# Patient Record
Sex: MALE | Race: OTHER | NOT HISPANIC OR LATINO
[De-identification: names, ages, dates, MRNs, and addresses within clinical notes are randomized per-mention and may not be internally consistent; named-entity substitution may affect disease eponyms.]

---

## 2017-05-28 ENCOUNTER — TRANSCRIPTION ENCOUNTER (OUTPATIENT)
Age: 37
End: 2017-05-28

## 2017-08-09 ENCOUNTER — TRANSCRIPTION ENCOUNTER (OUTPATIENT)
Age: 37
End: 2017-08-09

## 2018-02-20 ENCOUNTER — TRANSCRIPTION ENCOUNTER (OUTPATIENT)
Age: 38
End: 2018-02-20

## 2020-03-31 ENCOUNTER — TRANSCRIPTION ENCOUNTER (OUTPATIENT)
Age: 40
End: 2020-03-31

## 2020-06-26 RX ORDER — CEPHALEXIN 500 MG
500 CAPSULE ORAL
Qty: 0 | Refills: 0 | DISCHARGE
Start: 2020-06-26

## 2020-06-26 RX ORDER — MUPIROCIN 20 MG/G
1 OINTMENT TOPICAL
Qty: 0 | Refills: 0 | DISCHARGE
Start: 2020-06-26

## 2020-06-28 ENCOUNTER — INPATIENT (INPATIENT)
Facility: HOSPITAL | Age: 40
LOS: 2 days | Discharge: HOME | DRG: 603 | End: 2020-07-01
Attending: INTERNAL MEDICINE | Admitting: INTERNAL MEDICINE
Payer: COMMERCIAL

## 2020-06-28 ENCOUNTER — EMERGENCY (EMERGENCY)
Facility: HOSPITAL | Age: 40
LOS: 1 days | End: 2020-06-28
Attending: EMERGENCY MEDICINE | Admitting: EMERGENCY MEDICINE
Payer: COMMERCIAL

## 2020-06-28 VITALS
WEIGHT: 184.97 LBS | DIASTOLIC BLOOD PRESSURE: 80 MMHG | HEART RATE: 68 BPM | TEMPERATURE: 98 F | SYSTOLIC BLOOD PRESSURE: 140 MMHG | HEIGHT: 71 IN | RESPIRATION RATE: 15 BRPM | OXYGEN SATURATION: 98 %

## 2020-06-28 VITALS
HEART RATE: 88 BPM | OXYGEN SATURATION: 100 % | SYSTOLIC BLOOD PRESSURE: 129 MMHG | RESPIRATION RATE: 13 BRPM | DIASTOLIC BLOOD PRESSURE: 85 MMHG | TEMPERATURE: 98 F

## 2020-06-28 VITALS
DIASTOLIC BLOOD PRESSURE: 89 MMHG | SYSTOLIC BLOOD PRESSURE: 136 MMHG | WEIGHT: 184.97 LBS | HEART RATE: 75 BPM | OXYGEN SATURATION: 99 % | HEIGHT: 71 IN | TEMPERATURE: 98 F | RESPIRATION RATE: 14 BRPM

## 2020-06-28 DIAGNOSIS — I10 ESSENTIAL (PRIMARY) HYPERTENSION: ICD-10-CM

## 2020-06-28 DIAGNOSIS — L03.90 CELLULITIS, UNSPECIFIED: ICD-10-CM

## 2020-06-28 DIAGNOSIS — Z29.9 ENCOUNTER FOR PROPHYLACTIC MEASURES, UNSPECIFIED: ICD-10-CM

## 2020-06-28 LAB
ALBUMIN SERPL ELPH-MCNC: 3.8 G/DL — SIGNIFICANT CHANGE UP (ref 3.3–5)
ALP SERPL-CCNC: 53 U/L — SIGNIFICANT CHANGE UP (ref 30–120)
ALT FLD-CCNC: 18 U/L DA — SIGNIFICANT CHANGE UP (ref 10–60)
ANION GAP SERPL CALC-SCNC: 12 MMOL/L — SIGNIFICANT CHANGE UP (ref 5–17)
ANION GAP SERPL CALC-SCNC: 5 MMOL/L — SIGNIFICANT CHANGE UP (ref 5–17)
APTT BLD: 37 SEC — SIGNIFICANT CHANGE UP (ref 28.5–37)
AST SERPL-CCNC: 20 U/L — SIGNIFICANT CHANGE UP (ref 10–40)
BASOPHILS # BLD AUTO: 0.03 K/UL — SIGNIFICANT CHANGE UP (ref 0–0.2)
BASOPHILS # BLD AUTO: 0.04 K/UL — SIGNIFICANT CHANGE UP (ref 0–0.2)
BASOPHILS NFR BLD AUTO: 0.4 % — SIGNIFICANT CHANGE UP (ref 0–2)
BASOPHILS NFR BLD AUTO: 0.6 % — SIGNIFICANT CHANGE UP (ref 0–2)
BILIRUB SERPL-MCNC: 0.4 MG/DL — SIGNIFICANT CHANGE UP (ref 0.2–1.2)
BUN SERPL-MCNC: 10 MG/DL — SIGNIFICANT CHANGE UP (ref 7–23)
BUN SERPL-MCNC: 12 MG/DL — SIGNIFICANT CHANGE UP (ref 7–23)
CALCIUM SERPL-MCNC: 8.4 MG/DL — LOW (ref 8.5–10.1)
CALCIUM SERPL-MCNC: 9.1 MG/DL — SIGNIFICANT CHANGE UP (ref 8.4–10.5)
CHLORIDE SERPL-SCNC: 108 MMOL/L — SIGNIFICANT CHANGE UP (ref 96–108)
CHLORIDE SERPL-SCNC: 112 MMOL/L — HIGH (ref 96–108)
CO2 SERPL-SCNC: 22 MMOL/L — SIGNIFICANT CHANGE UP (ref 22–31)
CO2 SERPL-SCNC: 26 MMOL/L — SIGNIFICANT CHANGE UP (ref 22–31)
CREAT SERPL-MCNC: 1.1 MG/DL — SIGNIFICANT CHANGE UP (ref 0.5–1.3)
CREAT SERPL-MCNC: 1.24 MG/DL — SIGNIFICANT CHANGE UP (ref 0.5–1.3)
EOSINOPHIL # BLD AUTO: 0.35 K/UL — SIGNIFICANT CHANGE UP (ref 0–0.5)
EOSINOPHIL # BLD AUTO: 0.4 K/UL — SIGNIFICANT CHANGE UP (ref 0–0.5)
EOSINOPHIL NFR BLD AUTO: 4.4 % — SIGNIFICANT CHANGE UP (ref 0–6)
EOSINOPHIL NFR BLD AUTO: 5.7 % — SIGNIFICANT CHANGE UP (ref 0–6)
GLUCOSE SERPL-MCNC: 101 MG/DL — HIGH (ref 70–99)
GLUCOSE SERPL-MCNC: 83 MG/DL — SIGNIFICANT CHANGE UP (ref 70–99)
HCT VFR BLD CALC: 40.8 % — SIGNIFICANT CHANGE UP (ref 39–50)
HCT VFR BLD CALC: 47.3 % — SIGNIFICANT CHANGE UP (ref 39–50)
HGB BLD-MCNC: 13.9 G/DL — SIGNIFICANT CHANGE UP (ref 13–17)
HGB BLD-MCNC: 15.7 G/DL — SIGNIFICANT CHANGE UP (ref 13–17)
IMM GRANULOCYTES NFR BLD AUTO: 0.3 % — SIGNIFICANT CHANGE UP (ref 0–1.5)
IMM GRANULOCYTES NFR BLD AUTO: 0.3 % — SIGNIFICANT CHANGE UP (ref 0–1.5)
INR BLD: 0.98 RATIO — SIGNIFICANT CHANGE UP (ref 0.88–1.16)
LACTATE SERPL-SCNC: 0.9 MMOL/L — SIGNIFICANT CHANGE UP (ref 0.7–2)
LYMPHOCYTES # BLD AUTO: 1.7 K/UL — SIGNIFICANT CHANGE UP (ref 1–3.3)
LYMPHOCYTES # BLD AUTO: 1.84 K/UL — SIGNIFICANT CHANGE UP (ref 1–3.3)
LYMPHOCYTES # BLD AUTO: 21.3 % — SIGNIFICANT CHANGE UP (ref 13–44)
LYMPHOCYTES # BLD AUTO: 26.1 % — SIGNIFICANT CHANGE UP (ref 13–44)
MCHC RBC-ENTMCNC: 31.7 PG — SIGNIFICANT CHANGE UP (ref 27–34)
MCHC RBC-ENTMCNC: 31.8 PG — SIGNIFICANT CHANGE UP (ref 27–34)
MCHC RBC-ENTMCNC: 33.2 GM/DL — SIGNIFICANT CHANGE UP (ref 32–36)
MCHC RBC-ENTMCNC: 34.1 GM/DL — SIGNIFICANT CHANGE UP (ref 32–36)
MCV RBC AUTO: 93.2 FL — SIGNIFICANT CHANGE UP (ref 80–100)
MCV RBC AUTO: 95.9 FL — SIGNIFICANT CHANGE UP (ref 80–100)
MONOCYTES # BLD AUTO: 0.62 K/UL — SIGNIFICANT CHANGE UP (ref 0–0.9)
MONOCYTES # BLD AUTO: 0.71 K/UL — SIGNIFICANT CHANGE UP (ref 0–0.9)
MONOCYTES NFR BLD AUTO: 8.8 % — SIGNIFICANT CHANGE UP (ref 2–14)
MONOCYTES NFR BLD AUTO: 8.9 % — SIGNIFICANT CHANGE UP (ref 2–14)
NEUTROPHILS # BLD AUTO: 4.12 K/UL — SIGNIFICANT CHANGE UP (ref 1.8–7.4)
NEUTROPHILS # BLD AUTO: 5.17 K/UL — SIGNIFICANT CHANGE UP (ref 1.8–7.4)
NEUTROPHILS NFR BLD AUTO: 58.5 % — SIGNIFICANT CHANGE UP (ref 43–77)
NEUTROPHILS NFR BLD AUTO: 64.7 % — SIGNIFICANT CHANGE UP (ref 43–77)
NRBC # BLD: 0 /100 WBCS — SIGNIFICANT CHANGE UP (ref 0–0)
NRBC # BLD: 0 /100 WBCS — SIGNIFICANT CHANGE UP (ref 0–0)
PLATELET # BLD AUTO: 220 K/UL — SIGNIFICANT CHANGE UP (ref 150–400)
PLATELET # BLD AUTO: 255 K/UL — SIGNIFICANT CHANGE UP (ref 150–400)
POTASSIUM SERPL-MCNC: 3.8 MMOL/L — SIGNIFICANT CHANGE UP (ref 3.5–5.3)
POTASSIUM SERPL-MCNC: 4.2 MMOL/L — SIGNIFICANT CHANGE UP (ref 3.5–5.3)
POTASSIUM SERPL-SCNC: 3.8 MMOL/L — SIGNIFICANT CHANGE UP (ref 3.5–5.3)
POTASSIUM SERPL-SCNC: 4.2 MMOL/L — SIGNIFICANT CHANGE UP (ref 3.5–5.3)
PROT SERPL-MCNC: 7.9 G/DL — SIGNIFICANT CHANGE UP (ref 6–8.3)
PROTHROM AB SERPL-ACNC: 10.9 SEC — SIGNIFICANT CHANGE UP (ref 10–12.9)
RBC # BLD: 4.38 M/UL — SIGNIFICANT CHANGE UP (ref 4.2–5.8)
RBC # BLD: 4.93 M/UL — SIGNIFICANT CHANGE UP (ref 4.2–5.8)
RBC # FLD: 12.8 % — SIGNIFICANT CHANGE UP (ref 10.3–14.5)
RBC # FLD: 12.9 % — SIGNIFICANT CHANGE UP (ref 10.3–14.5)
SARS-COV-2 RNA SPEC QL NAA+PROBE: SIGNIFICANT CHANGE UP
SODIUM SERPL-SCNC: 142 MMOL/L — SIGNIFICANT CHANGE UP (ref 135–145)
SODIUM SERPL-SCNC: 143 MMOL/L — SIGNIFICANT CHANGE UP (ref 135–145)
WBC # BLD: 7.04 K/UL — SIGNIFICANT CHANGE UP (ref 3.8–10.5)
WBC # BLD: 7.98 K/UL — SIGNIFICANT CHANGE UP (ref 3.8–10.5)
WBC # FLD AUTO: 7.04 K/UL — SIGNIFICANT CHANGE UP (ref 3.8–10.5)
WBC # FLD AUTO: 7.98 K/UL — SIGNIFICANT CHANGE UP (ref 3.8–10.5)

## 2020-06-28 PROCEDURE — 80053 COMPREHEN METABOLIC PANEL: CPT

## 2020-06-28 PROCEDURE — 87040 BLOOD CULTURE FOR BACTERIA: CPT

## 2020-06-28 PROCEDURE — 96365 THER/PROPH/DIAG IV INF INIT: CPT

## 2020-06-28 PROCEDURE — 85610 PROTHROMBIN TIME: CPT

## 2020-06-28 PROCEDURE — 36415 COLL VENOUS BLD VENIPUNCTURE: CPT

## 2020-06-28 PROCEDURE — U0003: CPT

## 2020-06-28 PROCEDURE — 99283 EMERGENCY DEPT VISIT LOW MDM: CPT

## 2020-06-28 PROCEDURE — 99285 EMERGENCY DEPT VISIT HI MDM: CPT

## 2020-06-28 PROCEDURE — 83605 ASSAY OF LACTIC ACID: CPT

## 2020-06-28 PROCEDURE — 99285 EMERGENCY DEPT VISIT HI MDM: CPT | Mod: 25

## 2020-06-28 PROCEDURE — 96375 TX/PRO/DX INJ NEW DRUG ADDON: CPT

## 2020-06-28 PROCEDURE — 93005 ELECTROCARDIOGRAM TRACING: CPT

## 2020-06-28 PROCEDURE — 85730 THROMBOPLASTIN TIME PARTIAL: CPT

## 2020-06-28 PROCEDURE — 71046 X-RAY EXAM CHEST 2 VIEWS: CPT

## 2020-06-28 PROCEDURE — 93010 ELECTROCARDIOGRAM REPORT: CPT

## 2020-06-28 PROCEDURE — 71046 X-RAY EXAM CHEST 2 VIEWS: CPT | Mod: 26

## 2020-06-28 PROCEDURE — 85027 COMPLETE CBC AUTOMATED: CPT

## 2020-06-28 RX ORDER — OXYCODONE AND ACETAMINOPHEN 5; 325 MG/1; MG/1
1 TABLET ORAL EVERY 4 HOURS
Refills: 0 | Status: DISCONTINUED | OUTPATIENT
Start: 2020-06-28 | End: 2020-06-28

## 2020-06-28 RX ORDER — HEPARIN SODIUM 5000 [USP'U]/ML
5000 INJECTION INTRAVENOUS; SUBCUTANEOUS EVERY 8 HOURS
Refills: 0 | Status: DISCONTINUED | OUTPATIENT
Start: 2020-06-28 | End: 2020-07-01

## 2020-06-28 RX ORDER — OXYCODONE AND ACETAMINOPHEN 5; 325 MG/1; MG/1
1 TABLET ORAL ONCE
Refills: 0 | Status: DISCONTINUED | OUTPATIENT
Start: 2020-06-28 | End: 2020-06-28

## 2020-06-28 RX ORDER — VANCOMYCIN HCL 1 G
1000 VIAL (EA) INTRAVENOUS ONCE
Refills: 0 | Status: COMPLETED | OUTPATIENT
Start: 2020-06-28 | End: 2020-06-28

## 2020-06-28 RX ORDER — VANCOMYCIN HCL 1 G
VIAL (EA) INTRAVENOUS
Refills: 0 | Status: DISCONTINUED | OUTPATIENT
Start: 2020-06-28 | End: 2020-06-30

## 2020-06-28 RX ORDER — VANCOMYCIN HCL 1 G
1000 VIAL (EA) INTRAVENOUS EVERY 8 HOURS
Refills: 0 | Status: DISCONTINUED | OUTPATIENT
Start: 2020-06-29 | End: 2020-06-30

## 2020-06-28 RX ORDER — OXYCODONE AND ACETAMINOPHEN 5; 325 MG/1; MG/1
2 TABLET ORAL EVERY 6 HOURS
Refills: 0 | Status: DISCONTINUED | OUTPATIENT
Start: 2020-06-28 | End: 2020-06-30

## 2020-06-28 RX ORDER — PIPERACILLIN AND TAZOBACTAM 4; .5 G/20ML; G/20ML
3.38 INJECTION, POWDER, LYOPHILIZED, FOR SOLUTION INTRAVENOUS ONCE
Refills: 0 | Status: COMPLETED | OUTPATIENT
Start: 2020-06-28 | End: 2020-06-28

## 2020-06-28 RX ORDER — SODIUM CHLORIDE 9 MG/ML
2600 INJECTION, SOLUTION INTRAVENOUS ONCE
Refills: 0 | Status: COMPLETED | OUTPATIENT
Start: 2020-06-28 | End: 2020-06-28

## 2020-06-28 RX ADMIN — Medication 250 MILLIGRAM(S): at 10:30

## 2020-06-28 RX ADMIN — HEPARIN SODIUM 5000 UNIT(S): 5000 INJECTION INTRAVENOUS; SUBCUTANEOUS at 21:38

## 2020-06-28 RX ADMIN — Medication 250 MILLIGRAM(S): at 18:07

## 2020-06-28 RX ADMIN — OXYCODONE AND ACETAMINOPHEN 1 TABLET(S): 5; 325 TABLET ORAL at 11:47

## 2020-06-28 RX ADMIN — OXYCODONE AND ACETAMINOPHEN 2 TABLET(S): 5; 325 TABLET ORAL at 19:55

## 2020-06-28 RX ADMIN — PIPERACILLIN AND TAZOBACTAM 3.38 GRAM(S): 4; .5 INJECTION, POWDER, LYOPHILIZED, FOR SOLUTION INTRAVENOUS at 10:31

## 2020-06-28 RX ADMIN — SODIUM CHLORIDE 2600 MILLILITER(S): 9 INJECTION, SOLUTION INTRAVENOUS at 10:30

## 2020-06-28 RX ADMIN — OXYCODONE AND ACETAMINOPHEN 1 TABLET(S): 5; 325 TABLET ORAL at 15:27

## 2020-06-28 RX ADMIN — PIPERACILLIN AND TAZOBACTAM 200 GRAM(S): 4; .5 INJECTION, POWDER, LYOPHILIZED, FOR SOLUTION INTRAVENOUS at 09:00

## 2020-06-28 NOTE — ED PROVIDER NOTE - CARE PLAN
Principal Discharge DX:	Cellulitis and abscess  Secondary Diagnosis:	Failure of outpatient treatment

## 2020-06-28 NOTE — H&P ADULT - RS GEN PE MLT RESP DETAILS PC
airway patent/clear to auscultation bilaterally/good air movement/no rhonchi/no rales/breath sounds equal

## 2020-06-28 NOTE — CONSULT NOTE ADULT - SUBJECTIVE AND OBJECTIVE BOX
HPI:41 yo male referred by dermatologist for iv abx due to worsening cellulitis. pt relates had cyst to left inner thigh, became painful erythematous on 6/25, saw derm 6/26, had I&D, started on doxy and keflex, borders of cellulitis drawn, but erythema has greatly expanded since then, so told by derm to go to hospital for iv abx. no fevers, chills, d/n/v, cp, sob, abd pain. pt seen at [place], transferred for admission      PAST MEDICAL & SURGICAL HISTORY:  neg    Antimicrobials      Immunological      Other  oxycodone    5 mG/acetaminophen 325 mG 1 Tablet(s) Oral every 4 hours PRN      Allergies    No Known Drug Allergies  shellfish (Hives (Mod to Severe))    Intolerances        SOCIAL HISTORY:  no toxic habits reported      FAMILY HISTORY: noncontrib      ROS:    EYES:  Negative  blurry vision or double vision  GASTROINTESTINAL:  Negative for nausea, vomiting, diarrhea  -otherwise negative except for subjective    Vital Signs Last 24 Hrs  T(C): 36.6 (28 Jun 2020 13:31), Max: 36.6 (28 Jun 2020 13:31)  T(F): 97.8 (28 Jun 2020 13:31), Max: 97.8 (28 Jun 2020 13:31)  HR: 60 (28 Jun 2020 13:31) (60 - 68)  BP: 128/85 (28 Jun 2020 13:31) (128/85 - 140/80)  BP(mean): --  RR: 16 (28 Jun 2020 13:31) (15 - 16)  SpO2: 100% (28 Jun 2020 13:31) (98% - 100%)    PE:  WDWN in no distress  HEENT:  NC, PERRL, sclerae anicteric, conjunctivae clear, EOMI.  Sinuses nontender, no nasal exudate.  No buccal or pharyngeal lesions, erythema or exudate  Neck:  Supple, no adenopathy  Lungs:  No accessory muscle use, bilaterally clear to auscultation  Cor:  RRR, S1, S2, no murmur appreciated  Abd:  Symmetric, normoactive BS.  Soft, nontender, no masses, guarding or rebound.  Liver and spleen not enlarged  Extrem:  No cyanosis or edema  Skin:  left grin with warm red area with noted fluid collection, very tender  Neuro: grossly intact  Musc: moving all limbs freely, no focal deficits    LABS:                                MICROBIOLOGY:      RADIOLOGY & ADDITIONAL STUDIES:    --

## 2020-06-28 NOTE — H&P ADULT - HISTORY OF PRESENT ILLNESS
40 M with pmhx htn referred by dermatologist for iv antibiotics due to worsening Cellulitis.   Patient reports fever since last week with worsening swelling and pain of his Left thigh noticed pus from the Cyst which prompted him to go to dermatologist who referred him to ED for iv antibiotics.     In the ed patient was seen by ID, received Vancomycin. 40 M with pmhx htn referred by dermatologist for iv antibiotics due to worsening Cellulitis.   Patient reports fever since last week with worsening swelling and pain of his Left thigh noticed pus from the Cyst which prompted him to go to dermatologist who referred him to ED for iv antibiotics.   Patient reports of having cyst for many years with no hx similar episodes in the past.   No hx similar episodes in the past.     In the ed patient was seen by ID, received Vancomycin.

## 2020-06-28 NOTE — ED PROVIDER NOTE - OBJECTIVE STATEMENT
pt referred by dermatologist for iv abx due to worsening cellulitis. pt relates had cyst to left inner thigh, became painful erythematous on 6/25, saw derm 6/26, had I&D, started on doxy and keflex, borders of cellulitis drawn, but erythema has greatly expanded since then, so told by derm to go to hospital for iv abx. no fevers, chills, d/n/v, cp, sob, abd pain. pt seen at [place], transferred for admission  pmd - [place]

## 2020-06-28 NOTE — ED PROVIDER NOTE - PHYSICAL EXAMINATION
GEN: awake, alert, well appearing, NAD   HENT: atraumatic, normocephalic, SUNDAR, EOMI, no midline instability, oropharynx w/o erythema or exudates, no lymphadenopathy  CV: normal rate and rhythm, S1, S2, no MRG, equal pulses throughout, no JVD  RESP: no distress, no IWOB, no retraction, clear to auscultation bilaterally   ABD: soft, nontender, nondistended, no rebound, no guarding, normoactive bowel sounds, no organomegally  MUSCULOSKELETAL: strenght 5/5 x 4, full range of motion, CMS intact   SKIN: L inguinal abscess, cellulitis open, draining   NEURO: Awake alert oriented x 3, no facial asymmetry, no slurred speech, no pronator drift, moving all extremities  PSYCH: no suicial ideation, no homicidal ideation, no depression, no anxiety, no hallucination

## 2020-06-28 NOTE — ED ADULT NURSE NOTE - OBJECTIVE STATEMENT
A&Ox4. VSS. Afebrile. Transferred from [place] for cellulitis. Lt inner thigh redness and swelling noted 14cm x 7.5cm. Inguinal abcess noted 2.5 x 2cm. Pt reports condition worsening on 6/25 and began doxycycline. Pain 8/10. Pt also has vesicles on his rt shin. A&Ox4. VSS. Afebrile. Transferred from [place] for cellulitis. Lt inner thigh redness and swelling noted 14cm x 7.5cm. Lt Inguinal abscess noted 2.5 x 2cm. Pt reports condition worsening on 6/25 and began doxycycline. Pain 8/10. Pt also has vesicles on his rt shin. A&Ox4. VSS. Afebrile. Transferred from [place] for cellulitis. Lt inner thigh redness and swelling noted 14cm x 7.5cm. Lt Inguinal cyst noted 2.5 x 2cm. Pt reports condition worsening on 6/25 and began doxycycline. Pain 8/10. Pt also has vesicles on his rt shin. A&Ox4. VSS. Afebrile. Transferred from [place] for cellulitis. Lt inner thigh redness and swelling noted 14cm x 7.5cm. Lt Inguinal abscess noted 2.5 x 2cm. Pt reports condition worsening on 6/25 and began doxycycline. Pain 8/10. Pt also has vesicles on his rt shin.

## 2020-06-28 NOTE — ED PROVIDER NOTE - CLINICAL SUMMARY MEDICAL DECISION MAKING FREE TEXT BOX
pt referred by dermatologist for iv abx due to worsening cellulitis. pt relates had cyst to left inner thigh, became painful erythematous on 6/25, saw derm 6/26, had I&D, started on doxy and keflex, borders of cellulitis drawn, but erythema has greatly expanded since then, so told by derm to go to hospital for iv abx. no fevers, chills, d/n/v, cp, sob, abd pain. pt seen at [place], transferred for admission  pmd - [place]

## 2020-06-28 NOTE — CONSULT NOTE ADULT - PROBLEM SELECTOR RECOMMENDATION 9
most critical is proper I+D but will associated purlent cellulitis will recommend Vnao IV-ideal if I+D sample can be sent for MRSA PCR and cultures  Thank you for consulting us and involving us in the management of this most interesting and challenging case.     We will follow along in the care of this patient.

## 2020-06-28 NOTE — ED ADULT NURSE NOTE - OBJECTIVE STATEMENT
41 y/o male received aox4 ambulatory sent to ED by PMD for left thigh cellulitis eval. pt reports having a cyst that was drained by PMD 2 days ago and has since developed redness to the left groin area and has now spreaded to upper thigh. area noted warm to touch. IVL started, antibiotics started.

## 2020-06-28 NOTE — ED PROVIDER NOTE - OBJECTIVE STATEMENT
pt referred by dermatologist for iv abx due to worsening cellulitis. pt relates had cyst to left inner thigh, became painful erythematous on 6/25, saw derm 6/26, had I&D, started on doxy and keflex, borders of cellulitis drawn, but erythema has greatly expanded since then, so told by derm to go to hospital for iv abx. no fevers, chills, d/n/v, cp, sob, abd pain.  pmd - [place]

## 2020-06-28 NOTE — H&P ADULT - ASSESSMENT
40 M with pmhx htn referred by dermatologist for iv antibiotics due to worsening Left thigh Cellulitis and abscess.

## 2020-06-28 NOTE — H&P ADULT - PROBLEM SELECTOR PLAN 1
s/p I and D by derm as out patient, start patient on vancomycin as per ID.  Follow up Blood cultures.  Follow up cultures done out patient and repeat I and D here if worsens.

## 2020-06-28 NOTE — ED PROVIDER NOTE - SKIN, MLM
Skin normal color for race, warm, dry. abscess left inner thigh with drainage and large surrounding cellulitis

## 2020-06-28 NOTE — ED ADULT NURSE NOTE - CHPI ED NUR SYMPTOMS NEG
no bleeding at site/no chills/no rectal pain/no fever/no vomiting/no blood in mucus/no drainage/no purulent drainage

## 2020-06-29 PROBLEM — I10 ESSENTIAL (PRIMARY) HYPERTENSION: Chronic | Status: ACTIVE | Noted: 2020-06-28

## 2020-06-29 LAB
ANION GAP SERPL CALC-SCNC: 5 MMOL/L — SIGNIFICANT CHANGE UP (ref 5–17)
BASOPHILS # BLD AUTO: 0.06 K/UL — SIGNIFICANT CHANGE UP (ref 0–0.2)
BASOPHILS NFR BLD AUTO: 0.9 % — SIGNIFICANT CHANGE UP (ref 0–2)
BUN SERPL-MCNC: 12 MG/DL — SIGNIFICANT CHANGE UP (ref 7–23)
CALCIUM SERPL-MCNC: 8.5 MG/DL — SIGNIFICANT CHANGE UP (ref 8.5–10.1)
CHLORIDE SERPL-SCNC: 110 MMOL/L — HIGH (ref 96–108)
CO2 SERPL-SCNC: 27 MMOL/L — SIGNIFICANT CHANGE UP (ref 22–31)
CREAT SERPL-MCNC: 1.1 MG/DL — SIGNIFICANT CHANGE UP (ref 0.5–1.3)
EOSINOPHIL # BLD AUTO: 0.38 K/UL — SIGNIFICANT CHANGE UP (ref 0–0.5)
EOSINOPHIL NFR BLD AUTO: 6 % — SIGNIFICANT CHANGE UP (ref 0–6)
GLUCOSE SERPL-MCNC: 126 MG/DL — HIGH (ref 70–99)
HCT VFR BLD CALC: 41.1 % — SIGNIFICANT CHANGE UP (ref 39–50)
HGB BLD-MCNC: 13.7 G/DL — SIGNIFICANT CHANGE UP (ref 13–17)
IMM GRANULOCYTES NFR BLD AUTO: 0.5 % — SIGNIFICANT CHANGE UP (ref 0–1.5)
LYMPHOCYTES # BLD AUTO: 1.76 K/UL — SIGNIFICANT CHANGE UP (ref 1–3.3)
LYMPHOCYTES # BLD AUTO: 27.8 % — SIGNIFICANT CHANGE UP (ref 13–44)
MCHC RBC-ENTMCNC: 31.4 PG — SIGNIFICANT CHANGE UP (ref 27–34)
MCHC RBC-ENTMCNC: 33.3 GM/DL — SIGNIFICANT CHANGE UP (ref 32–36)
MCV RBC AUTO: 94.1 FL — SIGNIFICANT CHANGE UP (ref 80–100)
MONOCYTES # BLD AUTO: 0.49 K/UL — SIGNIFICANT CHANGE UP (ref 0–0.9)
MONOCYTES NFR BLD AUTO: 7.7 % — SIGNIFICANT CHANGE UP (ref 2–14)
NEUTROPHILS # BLD AUTO: 3.61 K/UL — SIGNIFICANT CHANGE UP (ref 1.8–7.4)
NEUTROPHILS NFR BLD AUTO: 57.1 % — SIGNIFICANT CHANGE UP (ref 43–77)
NRBC # BLD: 0 /100 WBCS — SIGNIFICANT CHANGE UP (ref 0–0)
PLATELET # BLD AUTO: 216 K/UL — SIGNIFICANT CHANGE UP (ref 150–400)
POTASSIUM SERPL-MCNC: 3.8 MMOL/L — SIGNIFICANT CHANGE UP (ref 3.5–5.3)
POTASSIUM SERPL-SCNC: 3.8 MMOL/L — SIGNIFICANT CHANGE UP (ref 3.5–5.3)
RBC # BLD: 4.37 M/UL — SIGNIFICANT CHANGE UP (ref 4.2–5.8)
RBC # FLD: 12.8 % — SIGNIFICANT CHANGE UP (ref 10.3–14.5)
SODIUM SERPL-SCNC: 142 MMOL/L — SIGNIFICANT CHANGE UP (ref 135–145)
VANCOMYCIN TROUGH SERPL-MCNC: 11.2 UG/ML — SIGNIFICANT CHANGE UP (ref 10–20)
VANCOMYCIN TROUGH SERPL-MCNC: 14.2 UG/ML — SIGNIFICANT CHANGE UP (ref 10–20)
WBC # BLD: 6.33 K/UL — SIGNIFICANT CHANGE UP (ref 3.8–10.5)
WBC # FLD AUTO: 6.33 K/UL — SIGNIFICANT CHANGE UP (ref 3.8–10.5)

## 2020-06-29 RX ADMIN — Medication 250 MILLIGRAM(S): at 23:03

## 2020-06-29 RX ADMIN — OXYCODONE AND ACETAMINOPHEN 2 TABLET(S): 5; 325 TABLET ORAL at 23:11

## 2020-06-29 RX ADMIN — OXYCODONE AND ACETAMINOPHEN 2 TABLET(S): 5; 325 TABLET ORAL at 10:36

## 2020-06-29 RX ADMIN — HEPARIN SODIUM 5000 UNIT(S): 5000 INJECTION INTRAVENOUS; SUBCUTANEOUS at 14:39

## 2020-06-29 RX ADMIN — Medication 250 MILLIGRAM(S): at 06:25

## 2020-06-29 RX ADMIN — OXYCODONE AND ACETAMINOPHEN 2 TABLET(S): 5; 325 TABLET ORAL at 04:31

## 2020-06-29 RX ADMIN — Medication 250 MILLIGRAM(S): at 14:38

## 2020-06-29 RX ADMIN — HEPARIN SODIUM 5000 UNIT(S): 5000 INJECTION INTRAVENOUS; SUBCUTANEOUS at 23:03

## 2020-06-29 RX ADMIN — HEPARIN SODIUM 5000 UNIT(S): 5000 INJECTION INTRAVENOUS; SUBCUTANEOUS at 04:32

## 2020-06-29 RX ADMIN — OXYCODONE AND ACETAMINOPHEN 2 TABLET(S): 5; 325 TABLET ORAL at 16:45

## 2020-06-29 NOTE — PROGRESS NOTE ADULT - SUBJECTIVE AND OBJECTIVE BOX
Patient is a 40y old  Male who presents with a chief complaint of     SUBJECTIVE / OVERNIGHT EVENTS: No events over night.      MEDICATIONS  (STANDING):  heparin   Injectable 5000 Unit(s) SubCutaneous every 8 hours  vancomycin  IVPB 1000 milliGRAM(s) IV Intermittent every 8 hours  vancomycin  IVPB        MEDICATIONS  (PRN):  oxycodone    5 mG/acetaminophen 325 mG 2 Tablet(s) Oral every 6 hours PRN Severe Pain (7 - 10)    CAPILLARY BLOOD GLUCOSE        I&O's Summary    29 Jun 2020 07:01  -  29 Jun 2020 23:06  --------------------------------------------------------  IN: 250 mL / OUT: 0 mL / NET: 250 mL  T(C): 36.4 (06-29-20 @ 16:27), Max: 36.4 (06-29-20 @ 16:27)  HR: 63 (06-29-20 @ 16:27) (63 - 63)  BP: 126/74 (06-29-20 @ 16:27) (126/74 - 126/74)  RR: 18 (06-29-20 @ 16:27) (18 - 18)  SpO2: 97% (06-29-20 @ 16:27) (97% - 97%)      PHYSICAL EXAM:  GENERAL: NAD, well-developed  HEAD:  Atraumatic, Normocephalic  EYES: EOMI, conjunctiva and sclera clear  NECK: Supple, No JVD  CHEST/LUNG: Clear to auscultation bilaterally; No wheeze  HEART: Regular rate and rhythm; No murmurs, rubs, or gallops  ABDOMEN: Soft, Nontender, Nondistended; Bowel sounds present  EXTREMITIES:  Left thigh erythematous tender swollen 2x2 cm wound with scab seen   2+ Peripheral Pulses, No clubbing, cyanosis, or edema  PSYCH: AAOx3  NEUROLOGY: non-focal  SKIN: No rashes or lesions                                        13.7   6.33  )-----------( 216      ( 29 Jun 2020 06:53 )             41.1           LIVER FUNCTIONS - ( 28 Jun 2020 09:32 )  Alb: 3.8 g/dL / Pro: 7.9 g/dL / ALK PHOS: 53 U/L / ALT: 18 U/L DA / AST: 20 U/L / GGT: x           PT/INR - ( 28 Jun 2020 09:32 )   PT: 10.9 sec;   INR: 0.98 ratio         PTT - ( 28 Jun 2020 09:32 )  PTT:37.0 sec  142|110|12<126  3.8|27|1.10  8.5,--,--  06-29 @ 06:53            RADIOLOGY & ADDITIONAL TESTS:    Imaging Personally Reviewed:    Consultant(s) Notes Reviewed:      Care Discussed with Consultants/Other Providers:

## 2020-06-29 NOTE — PROGRESS NOTE ADULT - SUBJECTIVE AND OBJECTIVE BOX
infectious diseases progress note:    ROSALEE ROMERO is a 40y y. o. Male patient    No concerning overnight events    Allergies    No Known Drug Allergies  shellfish (Hives (Mod to Severe))    Intolerances        ANTIBIOTICS/RELEVANT:  antimicrobials  vancomycin  IVPB 1000 milliGRAM(s) IV Intermittent every 8 hours  vancomycin  IVPB        immunologic:    OTHER:  heparin   Injectable 5000 Unit(s) SubCutaneous every 8 hours  oxycodone    5 mG/acetaminophen 325 mG 2 Tablet(s) Oral every 6 hours PRN      Objective:  Vital Signs Last 24 Hrs  T(C): 36.6 (29 Jun 2020 07:50), Max: 37.2 (28 Jun 2020 21:59)  T(F): 97.8 (29 Jun 2020 07:50), Max: 98.9 (28 Jun 2020 21:59)  HR: 53 (29 Jun 2020 07:50) (53 - 60)  BP: 117/77 (29 Jun 2020 07:50) (117/77 - 143/89)  BP(mean): 97 (28 Jun 2020 15:44) (97 - 97)  RR: 15 (29 Jun 2020 07:50) (15 - 16)  SpO2: 98% (29 Jun 2020 07:50) (98% - 100%)    T(C): 36.6 (06-29-20 @ 07:50), Max: 37.2 (06-28-20 @ 21:59)  T(C): 36.6 (06-29-20 @ 07:50), Max: 37.2 (06-28-20 @ 21:59)  T(C): 36.6 (06-29-20 @ 07:50), Max: 37.2 (06-28-20 @ 21:59)    PHYSICAL EXAM:  HEENT: NC atraumatic  Neck: supple  Respiratory: no accessory muscle use, breathing comfortably  Cardiovascular: distant  Gastrointestinal: normal appearing, nondistended  Extremities: no clubbing, no cyanosis,  Skin: erythema improved but abscess present      LABS:                          13.7   6.33  )-----------( 216      ( 29 Jun 2020 06:53 )             41.1       6.33 06-29 @ 06:53  7.04 06-28 @ 17:00      06-29    142  |  110<H>  |  12  ----------------------------<  126<H>  3.8   |  27  |  1.10    Ca    8.5      29 Jun 2020 06:53        Creatinine, Serum: 1.10 mg/dL (06-29-20 @ 06:53)  Creatinine, Serum: 1.10 mg/dL (06-28-20 @ 17:00)                INFLAMMATORY MARKERS  Auto Neutrophil #: 3.61 K/uL (06-29-20 @ 06:53)  Auto Lymphocyte #: 1.76 K/uL (06-29-20 @ 06:53)  Auto Neutrophil #: 4.12 K/uL (06-28-20 @ 17:00)  Auto Lymphocyte #: 1.84 K/uL (06-28-20 @ 17:00)      Auto Eosinophil #: 0.38 K/uL (06-29-20 @ 06:53)  Auto Eosinophil #: 0.40 K/uL (06-28-20 @ 17:00)                                MICROBIOLOGY:              RADIOLOGY & ADDITIONAL STUDIES:

## 2020-06-30 DIAGNOSIS — K59.00 CONSTIPATION, UNSPECIFIED: ICD-10-CM

## 2020-06-30 PROCEDURE — 99223 1ST HOSP IP/OBS HIGH 75: CPT | Mod: 25

## 2020-06-30 PROCEDURE — 10060 I&D ABSCESS SIMPLE/SINGLE: CPT

## 2020-06-30 RX ORDER — SENNA PLUS 8.6 MG/1
2 TABLET ORAL AT BEDTIME
Refills: 0 | Status: DISCONTINUED | OUTPATIENT
Start: 2020-06-30 | End: 2020-07-01

## 2020-06-30 RX ORDER — POLYETHYLENE GLYCOL 3350 17 G/17G
17 POWDER, FOR SOLUTION ORAL DAILY
Refills: 0 | Status: DISCONTINUED | OUTPATIENT
Start: 2020-06-30 | End: 2020-07-01

## 2020-06-30 RX ORDER — OXYCODONE AND ACETAMINOPHEN 5; 325 MG/1; MG/1
2 TABLET ORAL EVERY 4 HOURS
Refills: 0 | Status: DISCONTINUED | OUTPATIENT
Start: 2020-06-30 | End: 2020-07-01

## 2020-06-30 RX ORDER — LINEZOLID 600 MG/300ML
600 INJECTION, SOLUTION INTRAVENOUS EVERY 12 HOURS
Refills: 0 | Status: DISCONTINUED | OUTPATIENT
Start: 2020-06-30 | End: 2020-07-01

## 2020-06-30 RX ADMIN — OXYCODONE AND ACETAMINOPHEN 2 TABLET(S): 5; 325 TABLET ORAL at 18:12

## 2020-06-30 RX ADMIN — POLYETHYLENE GLYCOL 3350 17 GRAM(S): 17 POWDER, FOR SOLUTION ORAL at 12:48

## 2020-06-30 RX ADMIN — LINEZOLID 600 MILLIGRAM(S): 600 INJECTION, SOLUTION INTRAVENOUS at 12:15

## 2020-06-30 RX ADMIN — OXYCODONE AND ACETAMINOPHEN 2 TABLET(S): 5; 325 TABLET ORAL at 07:29

## 2020-06-30 RX ADMIN — LINEZOLID 600 MILLIGRAM(S): 600 INJECTION, SOLUTION INTRAVENOUS at 21:37

## 2020-06-30 RX ADMIN — OXYCODONE AND ACETAMINOPHEN 2 TABLET(S): 5; 325 TABLET ORAL at 12:48

## 2020-06-30 RX ADMIN — SENNA PLUS 2 TABLET(S): 8.6 TABLET ORAL at 21:37

## 2020-06-30 RX ADMIN — HEPARIN SODIUM 5000 UNIT(S): 5000 INJECTION INTRAVENOUS; SUBCUTANEOUS at 21:37

## 2020-06-30 RX ADMIN — HEPARIN SODIUM 5000 UNIT(S): 5000 INJECTION INTRAVENOUS; SUBCUTANEOUS at 06:30

## 2020-06-30 RX ADMIN — HEPARIN SODIUM 5000 UNIT(S): 5000 INJECTION INTRAVENOUS; SUBCUTANEOUS at 12:15

## 2020-06-30 RX ADMIN — OXYCODONE AND ACETAMINOPHEN 2 TABLET(S): 5; 325 TABLET ORAL at 23:54

## 2020-06-30 RX ADMIN — Medication 250 MILLIGRAM(S): at 06:30

## 2020-06-30 NOTE — PROGRESS NOTE ADULT - SUBJECTIVE AND OBJECTIVE BOX
Patient is a 40y old  Male who presents with a chief complaint of Left thigh pain     HPI:  40 M with pmhx htn referred by dermatologist for iv antibiotics due to worsening Cellulitis.   Patient reports fever since last week with worsening swelling and pain of his Left thigh noticed pus from the Cyst which prompted him to go to dermatologist who referred him to ED for iv antibiotics.   Patient reports of having cyst for many years with no hx similar episodes in the past.   No hx similar episodes in the past.     In the ed patient was seen by ID, received Vancomycin. (28 Jun 2020 15:44)      SUBJECTIVE / OVERNIGHT EVENTS: No events over night.   Patient still reports pain 9-10/10 in his Rt groin.   No other complaints.      MEDICATIONS  (STANDING):  heparin   Injectable 5000 Unit(s) SubCutaneous every 8 hours  linezolid    Tablet 600 milliGRAM(s) Oral every 12 hours  polyethylene glycol 3350 17 Gram(s) Oral daily  senna 2 Tablet(s) Oral at bedtime    MEDICATIONS  (PRN):  oxycodone    5 mG/acetaminophen 325 mG 2 Tablet(s) Oral every 4 hours PRN Severe Pain (7 - 10)  I&O's Summary      T(C): 36.5 (06-30-20 @ 08:09), Max: 36.5 (06-30-20 @ 08:09)  HR: 52 (06-30-20 @ 08:09) (52 - 69)  BP: 136/89 (06-30-20 @ 08:09) (109/66 - 136/89)  RR: 17 (06-30-20 @ 08:09) (17 - 18)  SpO2: 99% (06-30-20 @ 08:09) (94% - 99%)      PHYSICAL EXAM:  GENERAL: NAD, well-developed  HEAD:  Atraumatic, Normocephalic  EYES: EOMI, conjunctiva and sclera clear  NECK: Supple, No JVD  CHEST/LUNG: Clear to auscultation bilaterally; No wheeze  HEART: Regular rate and rhythm; No murmurs, rubs, or gallops  ABDOMEN: Soft, Nontender, Nondistended; Bowel sounds present  EXTREMITIES:  Left thigh erythematous tender swollen 2x2 cm wound with scab seen   2+ Peripheral Pulses, No clubbing, cyanosis, or edema  PSYCH: AAOx3  NEUROLOGY: non-focal  SKIN: No rashes or lesions                                                 13.7   6.33  )-----------( 216      ( 29 Jun 2020 06:53 )             41.1                       RADIOLOGY & ADDITIONAL TESTS:    Imaging Personally Reviewed:    Consultant(s) Notes Reviewed:      Care Discussed with Consultants/Other Providers: Patient is a 40y old  Male who presents with a chief complaint of Left thigh pain     HPI:  40 M with pmhx htn referred by dermatologist for iv antibiotics due to worsening Cellulitis.   Patient reports fever since last week with worsening swelling and pain of his Left thigh noticed pus from the Cyst which prompted him to go to dermatologist who referred him to ED for iv antibiotics.   Patient reports of having cyst for many years with no hx similar episodes in the past.   No hx similar episodes in the past.     In the ed patient was seen by ID, received Vancomycin. (28 Jun 2020 15:44)      SUBJECTIVE / OVERNIGHT EVENTS: No events over night.   Patient still reports pain 9-10/10 in his Lt groin.   No other complaints.      MEDICATIONS  (STANDING):  heparin   Injectable 5000 Unit(s) SubCutaneous every 8 hours  linezolid    Tablet 600 milliGRAM(s) Oral every 12 hours  polyethylene glycol 3350 17 Gram(s) Oral daily  senna 2 Tablet(s) Oral at bedtime    MEDICATIONS  (PRN):  oxycodone    5 mG/acetaminophen 325 mG 2 Tablet(s) Oral every 4 hours PRN Severe Pain (7 - 10)  I&O's Summary      T(C): 36.5 (06-30-20 @ 08:09), Max: 36.5 (06-30-20 @ 08:09)  HR: 52 (06-30-20 @ 08:09) (52 - 69)  BP: 136/89 (06-30-20 @ 08:09) (109/66 - 136/89)  RR: 17 (06-30-20 @ 08:09) (17 - 18)  SpO2: 99% (06-30-20 @ 08:09) (94% - 99%)      PHYSICAL EXAM:  GENERAL: NAD, well-developed  HEAD:  Atraumatic, Normocephalic  EYES: EOMI, conjunctiva and sclera clear  NECK: Supple, No JVD  CHEST/LUNG: Clear to auscultation bilaterally; No wheeze  HEART: Regular rate and rhythm; No murmurs, rubs, or gallops  ABDOMEN: Soft, Nontender, Nondistended; Bowel sounds present  EXTREMITIES:  Left thigh erythematous tender swollen 2x2 cm wound with scab seen   2+ Peripheral Pulses, No clubbing, cyanosis, or edema  PSYCH: AAOx3  NEUROLOGY: non-focal  SKIN: No rashes or lesions                                                 13.7   6.33  )-----------( 216      ( 29 Jun 2020 06:53 )             41.1                       RADIOLOGY & ADDITIONAL TESTS:    Imaging Personally Reviewed:    Consultant(s) Notes Reviewed:      Care Discussed with Consultants/Other Providers:

## 2020-06-30 NOTE — PROGRESS NOTE ADULT - SUBJECTIVE AND OBJECTIVE BOX
infectious diseases progress note:    ROSALEE ROMERO is a 40y y. o. Male patient    Patient reports: noting some drainage from the left leg    ROS:    EYES:  Negative  blurry vision or double vision  GASTROINTESTINAL:  Negative for nausea, vomiting, diarrhea  -otherwise negative except for subjective    Allergies    shellfish (Hives (Mod to Severe))    Intolerances        ANTIBIOTICS/RELEVANT:  antimicrobials    immunologic:    OTHER:  heparin   Injectable 5000 Unit(s) SubCutaneous every 8 hours  oxycodone    5 mG/acetaminophen 325 mG 2 Tablet(s) Oral every 6 hours PRN      Objective:  Last 24-Vital Signs Last 24 Hrs  T(C): 36.5 (30 Jun 2020 08:09), Max: 36.5 (30 Jun 2020 08:09)  T(F): 97.7 (30 Jun 2020 08:09), Max: 97.7 (30 Jun 2020 08:09)  HR: 52 (30 Jun 2020 08:09) (52 - 69)  BP: 136/89 (30 Jun 2020 08:09) (109/66 - 136/89)  BP(mean): --  RR: 17 (30 Jun 2020 08:09) (17 - 18)  SpO2: 99% (30 Jun 2020 08:09) (94% - 99%)    T(C): 36.5 (06-30-20 @ 08:09), Max: 37.2 (06-28-20 @ 21:59)  T(F): 97.7 (06-30-20 @ 08:09), Max: 98.9 (06-28-20 @ 21:59)  T(C): 36.5 (06-30-20 @ 08:09), Max: 37.2 (06-28-20 @ 21:59)  T(F): 97.7 (06-30-20 @ 08:09), Max: 98.9 (06-28-20 @ 21:59)  T(C): 36.5 (06-30-20 @ 08:09), Max: 37.2 (06-28-20 @ 21:59)  T(F): 97.7 (06-30-20 @ 08:09), Max: 98.9 (06-28-20 @ 21:59)    PHYSICAL EXAM:  Constitutional: Well-developed, well nourished  Eyes: PERRLA, EOMI  Ear/Nose/Throat: oropharynx normal	  Neck: no JVD, no lymphadenopathy, supple  Respiratory: no accessory muscle use, breathing comfortably  Cardiovascular: dostant  Gastrointestinal: soft, NT, normal appearance, nondistended  Extremities: no clubbing, no cyanosis, edema absent  Neuro: patient alert, oriented and appropriate  Skin: left leg with resolved erythema and swelling but hard indurated area with some drainage      LABS:                        13.7   6.33  )-----------( 216      ( 29 Jun 2020 06:53 )             41.1       WBC 6.33  06-29 @ 06:53  WBC 7.04  06-28 @ 17:00  WBC 7.98  06-28 @ 09:32      06-29    142  |  110<H>  |  12  ----------------------------<  126<H>  3.8   |  27  |  1.10    Ca    8.5      29 Jun 2020 06:53        Creatinine, Serum: 1.10 mg/dL (06-29-20 @ 06:53)  Creatinine, Serum: 1.10 mg/dL (06-28-20 @ 17:00)  Creatinine, Serum: 1.24 mg/dL (06-28-20 @ 09:32)                MICROBIOLOGY:              RADIOLOGY & ADDITIONAL STUDIES:

## 2020-06-30 NOTE — CONSULT NOTE ADULT - SUBJECTIVE AND OBJECTIVE BOX
GENERAL SURGERY CONSULT NOTE    Patient is a 40y old  Male who presents with a chief complaint of left thigh pain.    HPI:  40 M with pmhx htn referred by dermatologist for iv antibiotics due to worsening Cellulitis.   Patient reports fever since last week with worsening swelling and pain of his Left thigh noticed pus from the Cyst which prompted him to go to dermatologist who referred him to ED for iv antibiotics.   Patient reports of having cyst for many years with no hx similar episodes in the past.   No hx similar episodes in the past.     In the ed patient was seen by ID, received Vancomycin. (28 Jun 2020 15:44)    Currently patient with continued complaints of left thigh pain.  States cellulitis has improved dramatically since he has been in hospital.  Continues to have a large "cyst/abscess" to left upper thigh.  Had it I&D on June 26 by Dermatologist.  Markings for cellulitis noted on left thigh extending to just above the knee.  Currently being treated with antibiotics.  Denies fevers, chills, SOB, chest pain, back pain, other general complaints.       PAST MEDICAL & SURGICAL HISTORY:  HTN (hypertension), benign  Hypertension  No significant past surgical history      REVIEW OF SYSTEMS    General: No current fevers, chills	    Skin/Breast:  + cellulitis to left thigh, + abscess/cyst to left upper thigh  	  Ophthalmologic: no changes in vision    Respiratory and Thorax: No SOB, no chest pain  	  Cardiovascular:	 No palpitations, H/O HTN    Gastrointestinal:	 No N/V/D    Genitourinary:	No urinary issues    Musculoskeletal:	  no difficulties    Neurological:	 no headaches    Psychiatric:	no anxiety, depression    I have reviewed 9 systems with the patient and the only positive findings were       MEDICATIONS  (STANDING):  heparin   Injectable 5000 Unit(s) SubCutaneous every 8 hours  linezolid    Tablet 600 milliGRAM(s) Oral every 12 hours  polyethylene glycol 3350 17 Gram(s) Oral daily  senna 2 Tablet(s) Oral at bedtime    MEDICATIONS  (PRN):  oxycodone    5 mG/acetaminophen 325 mG 2 Tablet(s) Oral every 4 hours PRN Severe Pain (7 - 10)      Allergies    No Known Drug Allergies  shellfish (Hives (Mod to Severe))          SOCIAL HISTORY          Smoking: Yes [X ]  No [ ]   ______pk yrs          ETOH  Yes [ ]  No [X ]  Social [ ]          DRUGS:  Yes [ ]  No [ X]  if so what______________    FAMILY HISTORY:  No pertinent history      Vital Signs Last 24 Hrs  T(C): 36.5 (30 Jun 2020 08:09), Max: 36.5 (30 Jun 2020 08:09)  T(F): 97.7 (30 Jun 2020 08:09), Max: 97.7 (30 Jun 2020 08:09)  HR: 52 (30 Jun 2020 08:09) (52 - 69)  BP: 136/89 (30 Jun 2020 08:09) (109/66 - 136/89)  BP(mean): --  RR: 17 (30 Jun 2020 08:09) (17 - 18)  SpO2: 99% (30 Jun 2020 08:09) (94% - 99%)    Physical Exam:    General:  Appears stated age, well-groomed, well-nourished, no distress  Chest:  clear breath sounds  Cardiovascular:  Regular rate & rhythm  Extremities:  Left thigh with markings for previous borders of the cellulitis.  Currently erythema much smaller area than markings. + 2-3cm x 3cm abscess/cyst- oblong in shape.  Appears to have a head on cyst, with indurated surrounding area.   Musculoskeletal:  normal strength, Good ROM  Neuro/Psych:  Alert, oriented to time, place and person       LABS:                        13.7   6.33  )-----------( 216      ( 29 Jun 2020 06:53 )             41.1     06-29    142  |  110<H>  |  12  ----------------------------<  126<H>  3.8   |  27  |  1.10    Ca    8.5      29 Jun 2020 06:53            RADIOLOGY & ADDITIONAL STUDIES:    Risks, benefits, and alternatives to treatment discussed. All questions answered with understanding.

## 2020-06-30 NOTE — CONSULT NOTE ADULT - ASSESSMENT
39 yo male with pmhx of HTN, here for left thigh pain diagnosed as cellulitis with abscess.  Patient currently doing well on antibiotics- Linezolid.      Plan:   Discussed with Dr. Nickerson who will see patient today.  Possible I&D of abscess  Continue antibiotics  Possible warm compresses.  Continue current medical management 41 yo male with pmhx of HTN, here for left thigh pain diagnosed as cellulitis with abscess.  Patient currently doing well on antibiotics- Linezolid.  Blood cultures currently negative- pending final results.  Pt currently afebrile without a white count.       Plan:   Discussed with Dr. Nickerson who will see patient today.  Possible I&D of abscess  Continue antibiotics  Possible warm compresses.  Continue current medical management

## 2020-06-30 NOTE — CONSULT NOTE ADULT - ATTENDING COMMENTS
I have personally seen, examined, and participated in the care of this patient. I have reviewed all pertinent clinical information, including history, physical exam, plan, and the PA's note and agree except as noted.    Cellulitis appears significantly improved as per patient and evaluation of previous marking of delineation.  Ulcerated cystic lesion proximal medial left thigh at area of prior I&D (done by his Dermatologist).  Continues on IV Vanco    Local wound exploration performed with removal of additional cyst wall/capsule.  Superficial necrotic skin sloughed and brushed away with moist gauze.  Wound packed with 1/2 inch iodoform.  Local wound care instructions provided.  Pt expressed understanding and capacity to change dressing himself.  Daily iodoform dressing change.  Wash wound with soap and water daily.  Dry 4x4 guaze dressing to cover.    No contraindication to discharge home.  f/u with me in office next week to reassess wound.

## 2020-07-01 ENCOUNTER — TRANSCRIPTION ENCOUNTER (OUTPATIENT)
Age: 40
End: 2020-07-01

## 2020-07-01 VITALS
TEMPERATURE: 98 F | SYSTOLIC BLOOD PRESSURE: 133 MMHG | OXYGEN SATURATION: 98 % | RESPIRATION RATE: 16 BRPM | HEART RATE: 50 BPM | DIASTOLIC BLOOD PRESSURE: 81 MMHG

## 2020-07-01 PROCEDURE — 99285 EMERGENCY DEPT VISIT HI MDM: CPT

## 2020-07-01 PROCEDURE — 80048 BASIC METABOLIC PNL TOTAL CA: CPT

## 2020-07-01 PROCEDURE — 85027 COMPLETE CBC AUTOMATED: CPT

## 2020-07-01 PROCEDURE — 80202 ASSAY OF VANCOMYCIN: CPT

## 2020-07-01 PROCEDURE — 36415 COLL VENOUS BLD VENIPUNCTURE: CPT

## 2020-07-01 RX ORDER — DEXTROAMPHETAMINE SACCHARATE, AMPHETAMINE ASPARTATE, DEXTROAMPHETAMINE SULFATE AND AMPHETAMINE SULFATE 1.875; 1.875; 1.875; 1.875 MG/1; MG/1; MG/1; MG/1
1 TABLET ORAL
Qty: 0 | Refills: 0 | DISCHARGE

## 2020-07-01 RX ORDER — AMLODIPINE BESYLATE 2.5 MG/1
1 TABLET ORAL
Qty: 0 | Refills: 0 | DISCHARGE

## 2020-07-01 RX ORDER — AZTREONAM 2 G
1 VIAL (EA) INJECTION
Qty: 8 | Refills: 0
Start: 2020-07-01 | End: 2020-07-04

## 2020-07-01 RX ORDER — LINEZOLID 600 MG/300ML
1 INJECTION, SOLUTION INTRAVENOUS
Qty: 8 | Refills: 0
Start: 2020-07-01 | End: 2020-07-04

## 2020-07-01 RX ORDER — CLONAZEPAM 1 MG
1 TABLET ORAL
Qty: 0 | Refills: 0 | DISCHARGE

## 2020-07-01 RX ORDER — METOPROLOL TARTRATE 50 MG
1 TABLET ORAL
Qty: 0 | Refills: 0 | DISCHARGE

## 2020-07-01 RX ORDER — CEPHALEXIN 500 MG
1 CAPSULE ORAL
Qty: 0 | Refills: 0 | DISCHARGE

## 2020-07-01 RX ADMIN — OXYCODONE AND ACETAMINOPHEN 2 TABLET(S): 5; 325 TABLET ORAL at 12:43

## 2020-07-01 RX ADMIN — HEPARIN SODIUM 5000 UNIT(S): 5000 INJECTION INTRAVENOUS; SUBCUTANEOUS at 06:27

## 2020-07-01 RX ADMIN — POLYETHYLENE GLYCOL 3350 17 GRAM(S): 17 POWDER, FOR SOLUTION ORAL at 11:45

## 2020-07-01 RX ADMIN — HEPARIN SODIUM 5000 UNIT(S): 5000 INJECTION INTRAVENOUS; SUBCUTANEOUS at 12:43

## 2020-07-01 RX ADMIN — OXYCODONE AND ACETAMINOPHEN 2 TABLET(S): 5; 325 TABLET ORAL at 08:16

## 2020-07-01 RX ADMIN — LINEZOLID 600 MILLIGRAM(S): 600 INJECTION, SOLUTION INTRAVENOUS at 06:28

## 2020-07-01 NOTE — DISCHARGE NOTE PROVIDER - NSDCMRMEDTOKEN_GEN_ALL_CORE_FT
Adderall 10 mg oral tablet: 1 tab(s) orally once a day (in the morning)  amLODIPine 5 mg oral tablet: 1 tab(s) orally once a day  linezolid 600 mg oral tablet: 1 tab(s) orally every 12 hours amLODIPine 5 mg oral tablet: 1 tab(s) orally once a day  clonazePAM 1 mg oral tablet: orally once a day, As Needed for anxiety  linezolid 600 mg oral tablet: 1 tab(s) orally every 12 hours

## 2020-07-01 NOTE — DISCHARGE NOTE PROVIDER - HOSPITAL COURSE
40 M with pmhx htn referred by dermatologist for iv antibiotics due to worsening Cellulitis.     Patient reports fever since last week with worsening swelling and pain of his Left thigh noticed pus from the Cyst which prompted him to go to dermatologist who referred him to ED for iv antibiotics.     Patient reports of having cyst for many years with no hx similar episodes in the past.     No hx similar episodes in the past.         In the ed patient was seen by ID, received Vancomycin.          Patient was seen by ID, surgery, continued with Vanco and changed to Zyvox later in the hospital course. Recommend Zyvox on discharge.     Stable for discharge.     Follow up with pmd in 1 week.

## 2020-07-01 NOTE — PROGRESS NOTE ADULT - SUBJECTIVE AND OBJECTIVE BOX
infectious diseases progress note:    ROSALEE ROMERO is a 40y y. o. Male patient    No concerning overnight events    Allergies    No Known Drug Allergies  shellfish (Hives (Mod to Severe))    Intolerances        ANTIBIOTICS/RELEVANT:  antimicrobials  linezolid    Tablet 600 milliGRAM(s) Oral every 12 hours    immunologic:    OTHER:  heparin   Injectable 5000 Unit(s) SubCutaneous every 8 hours  oxycodone    5 mG/acetaminophen 325 mG 2 Tablet(s) Oral every 4 hours PRN  polyethylene glycol 3350 17 Gram(s) Oral daily  senna 2 Tablet(s) Oral at bedtime      Objective:  Vital Signs Last 24 Hrs  T(C): 36.5 (01 Jul 2020 07:44), Max: 36.5 (01 Jul 2020 00:03)  T(F): 97.7 (01 Jul 2020 07:44), Max: 97.7 (01 Jul 2020 00:03)  HR: 50 (01 Jul 2020 07:44) (50 - 60)  BP: 133/81 (01 Jul 2020 07:44) (120/74 - 143/85)  BP(mean): --  RR: 16 (01 Jul 2020 07:44) (16 - 18)  SpO2: 98% (01 Jul 2020 07:44) (96% - 98%)    T(C): 36.5 (07-01-20 @ 07:44), Max: 36.5 (06-30-20 @ 08:09)  T(C): 36.5 (07-01-20 @ 07:44), Max: 37.2 (06-28-20 @ 21:59)  T(C): 36.5 (07-01-20 @ 07:44), Max: 37.2 (06-28-20 @ 21:59)    PHYSICAL EXAM:  HEENT: NC atraumatic  Neck: supple  Respiratory: no accessory muscle use, breathing comfortably  Cardiovascular: distant  Gastrointestinal: normal appearing, nondistended  Extremities: no clubbing, no cyanosis, left thigh erythema resolved ad dressed I+D site      LABS:        6.33 06-29 @ 06:53  7.04 06-28 @ 17:00  7.98 06-28 @ 09:32      Creatinine, Serum: 1.10 mg/dL (06-29-20 @ 06:53)  Creatinine, Serum: 1.10 mg/dL (06-28-20 @ 17:00)  Creatinine, Serum: 1.24 mg/dL (06-28-20 @ 09:32)      INFLAMMATORY MARKERS  Auto Neutrophil #: 3.61 K/uL (06-29-20 @ 06:53)  Auto Lymphocyte #: 1.76 K/uL (06-29-20 @ 06:53)  Auto Lymphocyte #: 1.84 K/uL (06-28-20 @ 17:00)  Auto Neutrophil #: 4.12 K/uL (06-28-20 @ 17:00)  Auto Neutrophil #: 5.17 K/uL (06-28-20 @ 09:32)  Auto Lymphocyte #: 1.70 K/uL (06-28-20 @ 09:32)    Lactate, Blood: 0.9 mmol/L (06-28-20 @ 09:32)    Auto Eosinophil #: 0.38 K/uL (06-29-20 @ 06:53)  Auto Eosinophil #: 0.40 K/uL (06-28-20 @ 17:00)  Auto Eosinophil #: 0.35 K/uL (06-28-20 @ 09:32)      Activated Partial Thromboplastin Time: 37.0 sec (06-28-20 @ 09:32)  INR: 0.98 ratio (06-28-20 @ 09:32)          MICROBIOLOGY:              RADIOLOGY & ADDITIONAL STUDIES:

## 2020-07-01 NOTE — PROGRESS NOTE ADULT - PROBLEM SELECTOR PLAN 1
most critical is proper I+D but will associated purlent cellulitis continue Vanco with goal trough 10-15 with dosing per pharmacy protocol IV-ideal if I+D sample can be sent for MRSA PCR and cultures
Recommend:  Linezolid 600mg PO BID with last day 7/4 (can use good Rx  card for discount but want to achieve high level staph coverage)  warm moist compresses 5x/day as directed    From an ID standpoint no further requirement for inpatient status for the management of ID issues. Fine with discharge from ID standpoint when other medical issues no longer require inpatient care and social issues allow for a safe discharge plan.  Thank you for consulting us and involving us in the management of this most interesting and challenging case.     Please Call with any further questions
at this point no obvious collection but hard indurated area which may be a sebaceous cyst but no clear abscess.  Recommend:  Linezolid 600mg PO BID with last day 7/4 (can use good Rx  card for discount but want to achieve high level staph coverage)  warm moist compresses 5x/day as directed  followup with surgery to look at lesion/cyst removal    From an ID standpoint no further requirement for inpatient status for the management of ID issues. Fine with discharge from ID standpoint when other medical issues no longer require inpatient care and social issues allow for a safe discharge plan.  Thank you for consulting us and involving us in the management of this most interesting and challenging case.     Please Call with any further questions
s/p I and D by derm as out patient, ID following, change to Zyvox as per ID.   Blood cultures no growth to date.   Surgery consult called, follow up recs.   Pain meds- Percocet q4 prn for pain   Bowel regimen
s/p I and D by derm as out patient, ID following, change to Zyvox as per ID.   Blood cultures no growth to date.   s/p I and D by Surgery.   Pain meds- Percocet q4 prn for pain   Bowel regimen
s/p I and D by derm as out patient, ID following, continue with Vanco as per ID.   Follow up Blood cultures.  Follow up cultures done out patient and repeat I and D here if worsens.

## 2020-07-01 NOTE — CHART NOTE - NSCHARTNOTEFT_GEN_A_CORE
To Whom So Ever Concern       This is to state that Kassidy Garza got admitted at [facility] as he was not feeling well and was discharged on 07/01/2020.   He can return to work 07/06/2020.     Regards,    Dr. Osborn

## 2020-07-01 NOTE — PROGRESS NOTE ADULT - ASSESSMENT
39 yo male adm with left groin purulent cellultis with abscess
39 yo male adm with left groin purulent cellultis
40 M with pmhx htn referred by dermatologist for iv antibiotics due to worsening Left thigh Cellulitis and abscess.
41 yo male adm with left groin purulent cellultis

## 2020-07-01 NOTE — PROGRESS NOTE ADULT - PROVIDER SPECIALTY LIST ADULT
Hospitalist
Infectious Disease
Infectious Disease
Surgery
Infectious Disease

## 2020-07-01 NOTE — DISCHARGE NOTE NURSING/CASE MANAGEMENT/SOCIAL WORK - PATIENT PORTAL LINK FT
You can access the FollowMyHealth Patient Portal offered by [facility] by registering at the following website: [email or web address]. By joining [facility]’s FollowMyHealth portal, you will also be able to view your health information using other applications (apps) compatible with our system.

## 2020-07-01 NOTE — DISCHARGE NOTE PROVIDER - CARE PROVIDER_API CALL
RUBY LINTON  INTERNAL MEDICINE  [address] [place] [address]  Phone: [phone]  Fax: [phone]  Follow Up Time:

## 2020-07-01 NOTE — PROGRESS NOTE ADULT - SUBJECTIVE AND OBJECTIVE BOX
SURGERY  Spectralink x3848    Pt seen and examined. Pt denies any overnight events. Pt reports pain is well controlled. Denies any fevers/chills. Pt denies any nausea/vomiting. Pt reports ambulating. Tolerating diet.    T(C): 36.5 (07-01-20 @ 07:44), Max: 36.5 (07-01-20 @ 00:03)  HR: 50 (07-01-20 @ 07:44) (50 - 60)  BP: 133/81 (07-01-20 @ 07:44) (120/74 - 143/85)  RR: 16 (07-01-20 @ 07:44) (16 - 18)  SpO2: 98% (07-01-20 @ 07:44) (96% - 98%)  Wt(kg): --  ICU Vital Signs Last 24 Hrs  T(C): 36.5 (01 Jul 2020 07:44), Max: 36.5 (01 Jul 2020 00:03)  T(F): 97.7 (01 Jul 2020 07:44), Max: 97.7 (01 Jul 2020 00:03)  HR: 50 (01 Jul 2020 07:44) (50 - 60)  BP: 133/81 (01 Jul 2020 07:44) (120/74 - 143/85)  BP(mean): --  ABP: --  ABP(mean): --  RR: 16 (01 Jul 2020 07:44) (16 - 18)  SpO2: 98% (01 Jul 2020 07:44) (96% - 98%)    CAPILLARY BLOOD GLUCOSE    I&O's Detail    30 Jun 2020 07:01  -  01 Jul 2020 07:00  --------------------------------------------------------  IN:    Oral Fluid: 420 mL  Total IN: 420 mL    OUT:  Total OUT: 0 mL    Total NET: 420 mL    Physical exam: Pt sitting comfortably in bed in NAD  Chest- CTA bilaterally   CV- S1 & S2, RRR  Abdomen- Soft, non-tender, non-distended. (+) BS  Incision- Left groin region- sanguinous drainage to dressing, iodoform packing removed, wound clean, no evidence of purulence, edges clean with minimal erythema, previously marked cellulitis with significant improvement and no evidence of erythema, induration or warmth. NSLT and motor exam intact to LLE. Calves soft b/l, no ttp.    LABS:    Culture - Blood (collected 28 Jun 2020 21:20)  Source: .Blood Blood-Peripheral  Preliminary Report (29 Jun 2020 22:01):    No growth to date.    Culture - Blood (collected 28 Jun 2020 21:20)  Source: .Blood Blood-Peripheral  Preliminary Report (29 Jun 2020 22:01):    No growth to date.      HPI:  40 M with pmhx htn referred by dermatologist for iv antibiotics due to worsening Cellulitis. Patient reports fever since last week with worsening swelling and pain of his Left thigh noticed pus from the Cyst which prompted him to go to dermatologist who referred him to ED for iv antibiotics. Patient reports of having cyst for many years with no hx similar episodes in the past. No hx similar episodes in the past.     s/p Bedside I&D left thigh/cellulitis    -Continue iodoform packing and dry dressing changes  -Continue Linezolid course per ID  -Continue DVT Prophylaxis with SCD's and ambulation  -Continue Incentive Spiromtery  -Continue analgesia  -Encourage OOB/ambulation  -Follow up with Dr. Nickerson in the office in 1 week for wound check. SURGERY  Spectralink x3848    Pt seen and examined. Pt denies any overnight events. Pt reports pain is well controlled. Denies any fevers/chills. Pt denies any nausea/vomiting. Pt reports ambulating. Tolerating diet.    T(C): 36.5 (07-01-20 @ 07:44), Max: 36.5 (07-01-20 @ 00:03)  HR: 50 (07-01-20 @ 07:44) (50 - 60)  BP: 133/81 (07-01-20 @ 07:44) (120/74 - 143/85)  RR: 16 (07-01-20 @ 07:44) (16 - 18)  SpO2: 98% (07-01-20 @ 07:44) (96% - 98%)  Wt(kg): --  ICU Vital Signs Last 24 Hrs  T(C): 36.5 (01 Jul 2020 07:44), Max: 36.5 (01 Jul 2020 00:03)  T(F): 97.7 (01 Jul 2020 07:44), Max: 97.7 (01 Jul 2020 00:03)  HR: 50 (01 Jul 2020 07:44) (50 - 60)  BP: 133/81 (01 Jul 2020 07:44) (120/74 - 143/85)  BP(mean): --  ABP: --  ABP(mean): --  RR: 16 (01 Jul 2020 07:44) (16 - 18)  SpO2: 98% (01 Jul 2020 07:44) (96% - 98%)    CAPILLARY BLOOD GLUCOSE    I&O's Detail    30 Jun 2020 07:01  -  01 Jul 2020 07:00  --------------------------------------------------------  IN:    Oral Fluid: 420 mL  Total IN: 420 mL    OUT:  Total OUT: 0 mL    Total NET: 420 mL    Physical exam: Pt sitting comfortably in bed in NAD  Chest- CTA bilaterally   CV- S1 & S2, RRR  Abdomen- Soft, non-tender, non-distended. (+) BS  Incision- Left groin region- sanguinous drainage to dressing, iodoform packing removed, wound clean, no evidence of purulence, edges clean with minimal erythema, previously marked cellulitis with significant improvement and no evidence of erythema, induration or warmth. NSLT and motor exam intact to LLE. Calves soft b/l, no ttp.    LABS:    Culture - Blood (collected 28 Jun 2020 21:20)  Source: .Blood Blood-Peripheral  Preliminary Report (29 Jun 2020 22:01):    No growth to date.    Culture - Blood (collected 28 Jun 2020 21:20)  Source: .Blood Blood-Peripheral  Preliminary Report (29 Jun 2020 22:01):    No growth to date.      HPI:  40 M with pmhx htn referred by dermatologist for iv antibiotics due to worsening Cellulitis. Patient reports fever since last week with worsening swelling and pain of his Left thigh noticed pus from the Cyst which prompted him to go to dermatologist who referred him to ED for iv antibiotics. Patient reports of having cyst for many years with no hx similar episodes in the past. No hx similar episodes in the past.     s/p Bedside I&D left thigh/cellulitis    -Continue 1/4 inch iodoform packing and dry dressing changes daily  -Continue Linezolid course per ID  -Continue DVT Prophylaxis with SCD's and ambulation  -Continue Incentive Spiromtery  -Continue analgesia  -Encourage OOB/ambulation  -Follow up with Dr. Nickerson in the office in 1 week for wound check.   -Signing off, stable for discharge home

## 2020-07-01 NOTE — DISCHARGE NOTE PROVIDER - NSDCCPCAREPLAN_GEN_ALL_CORE_FT
PRINCIPAL DISCHARGE DIAGNOSIS  Diagnosis: Cellulitis and abscess  Assessment and Plan of Treatment: Continue with Zyvox as prescribed. Follow up with pmd in 1 week.      SECONDARY DISCHARGE DIAGNOSES  Diagnosis: Failure of outpatient treatment  Assessment and Plan of Treatment:

## 2020-07-01 NOTE — PROGRESS NOTE ADULT - SUBJECTIVE AND OBJECTIVE BOX
Patient is a 40y old  Male who presents with a chief complaint of Left thigh pain     HPI:  40 M with pmhx htn referred by dermatologist for iv antibiotics due to worsening Cellulitis.   Patient reports fever since last week with worsening swelling and pain of his Left thigh noticed pus from the Cyst which prompted him to go to dermatologist who referred him to ED for iv antibiotics.   Patient reports of having cyst for many years with no hx similar episodes in the past.   No hx similar episodes in the past.     In the ed patient was seen by ID, received Vancomycin. (28 Jun 2020 15:44)      SUBJECTIVE / OVERNIGHT EVENTS: No events over night.   Patient still reports pain 9-10/10 in his Lt groin.   No other complaints.      MEDICATIONS  (STANDING):  heparin   Injectable 5000 Unit(s) SubCutaneous every 8 hours  linezolid    Tablet 600 milliGRAM(s) Oral every 12 hours  polyethylene glycol 3350 17 Gram(s) Oral daily  senna 2 Tablet(s) Oral at bedtime    MEDICATIONS  (PRN):  oxycodone    5 mG/acetaminophen 325 mG 2 Tablet(s) Oral every 4 hours PRN Severe Pain (7 - 10)      T(C): 36.5 (07-01-20 @ 07:44), Max: 36.5 (07-01-20 @ 07:44)  HR: 50 (07-01-20 @ 07:44) (50 - 50)  BP: 133/81 (07-01-20 @ 07:44) (133/81 - 133/81)  RR: 16 (07-01-20 @ 07:44) (16 - 16)  SpO2: 98% (07-01-20 @ 07:44) (98% - 98%)  PHYSICAL EXAM:  GENERAL: NAD, well-developed  HEAD:  Atraumatic, Normocephalic  EYES: EOMI, conjunctiva and sclera clear  NECK: Supple, No JVD  CHEST/LUNG: Clear to auscultation bilaterally; No wheeze  HEART: Regular rate and rhythm; No murmurs, rubs, or gallops  ABDOMEN: Soft, Nontender, Nondistended; Bowel sounds present  EXTREMITIES:  Left thigh erythematous tender swollen 2x2 cm wound with scab seen   2+ Peripheral Pulses, No clubbing, cyanosis, or edema  PSYCH: AAOx3  NEUROLOGY: non-focal  SKIN: No rashes or lesions                       no new labs           RADIOLOGY & ADDITIONAL TESTS:    Imaging Personally Reviewed:    Consultant(s) Notes Reviewed:      Care Discussed with Consultants/Other Providers:

## 2020-07-01 NOTE — PROGRESS NOTE ADULT - PROBLEM SELECTOR PROBLEM 1
Cellulitis and abscess

## 2020-07-02 PROBLEM — I10 ESSENTIAL (PRIMARY) HYPERTENSION: Chronic | Status: ACTIVE | Noted: 2020-06-28

## 2020-07-03 LAB
CULTURE RESULTS: SIGNIFICANT CHANGE UP
CULTURE RESULTS: SIGNIFICANT CHANGE UP
SPECIMEN SOURCE: SIGNIFICANT CHANGE UP
SPECIMEN SOURCE: SIGNIFICANT CHANGE UP

## 2020-07-07 ENCOUNTER — APPOINTMENT (OUTPATIENT)
Dept: SURGERY | Facility: CLINIC | Age: 40
End: 2020-07-07
Payer: COMMERCIAL

## 2020-07-07 VITALS
WEIGHT: 176 LBS | HEIGHT: 71 IN | DIASTOLIC BLOOD PRESSURE: 94 MMHG | HEART RATE: 64 BPM | BODY MASS INDEX: 24.64 KG/M2 | SYSTOLIC BLOOD PRESSURE: 144 MMHG | OXYGEN SATURATION: 97 %

## 2020-07-07 DIAGNOSIS — F17.200 NICOTINE DEPENDENCE, UNSPECIFIED, UNCOMPLICATED: ICD-10-CM

## 2020-07-07 PROCEDURE — 99024 POSTOP FOLLOW-UP VISIT: CPT

## 2020-07-07 RX ORDER — CLONAZEPAM 1 MG/1
1 TABLET ORAL
Refills: 0 | Status: ACTIVE | COMMUNITY

## 2020-07-07 NOTE — PHYSICAL EXAM
[de-identified] : H [de-identified] : Cellulitis completely resolved.  I&D site near completely healed.

## 2020-07-07 NOTE — HISTORY OF PRESENT ILLNESS
[de-identified] : S/p recent hospitalization for thigh abscess with cellulitis.  \par Incision and drainage preformed at that time.\par \par returns today for routine follow up.

## 2020-07-07 NOTE — ASSESSMENT
[FreeTextEntry1] : s/p I&D of left proximal thigh abscess.  \par Near completely healed.  No indication for additional surgical intervention.\par Monitor for signs of recurrence or development of new cysts/abscesses.\par Local wound care instructions provided.\par f/u PRN.\par

## 2021-05-12 ENCOUNTER — EMERGENCY (EMERGENCY)
Facility: HOSPITAL | Age: 41
LOS: 1 days | Discharge: HOME | End: 2021-05-12
Attending: EMERGENCY MEDICINE | Admitting: EMERGENCY MEDICINE
Payer: COMMERCIAL

## 2021-05-12 VITALS
OXYGEN SATURATION: 98 % | HEIGHT: 71 IN | DIASTOLIC BLOOD PRESSURE: 87 MMHG | HEART RATE: 66 BPM | TEMPERATURE: 98 F | SYSTOLIC BLOOD PRESSURE: 142 MMHG | WEIGHT: 190.04 LBS | RESPIRATION RATE: 18 BRPM

## 2021-05-12 PROCEDURE — 10061 I&D ABSCESS COMP/MULTIPLE: CPT

## 2021-05-12 PROCEDURE — 87205 SMEAR GRAM STAIN: CPT

## 2021-05-12 PROCEDURE — 10060 I&D ABSCESS SIMPLE/SINGLE: CPT

## 2021-05-12 PROCEDURE — 99284 EMERGENCY DEPT VISIT MOD MDM: CPT | Mod: 25

## 2021-05-12 PROCEDURE — 87186 SC STD MICRODIL/AGAR DIL: CPT

## 2021-05-12 PROCEDURE — 87070 CULTURE OTHR SPECIMN AEROBIC: CPT

## 2021-05-12 PROCEDURE — 87077 CULTURE AEROBIC IDENTIFY: CPT

## 2021-05-12 PROCEDURE — 99283 EMERGENCY DEPT VISIT LOW MDM: CPT | Mod: 25

## 2021-05-12 RX ORDER — METOPROLOL TARTRATE 50 MG
0 TABLET ORAL
Qty: 0 | Refills: 0 | DISCHARGE

## 2021-05-12 RX ORDER — AMLODIPINE BESYLATE 2.5 MG/1
1 TABLET ORAL
Qty: 0 | Refills: 0 | DISCHARGE

## 2021-05-12 RX ORDER — CLONAZEPAM 1 MG
0 TABLET ORAL
Qty: 0 | Refills: 0 | DISCHARGE

## 2021-05-12 RX ORDER — AZTREONAM 2 G
1 VIAL (EA) INJECTION
Qty: 14 | Refills: 0
Start: 2021-05-12 | End: 2021-05-18

## 2021-05-12 RX ADMIN — Medication 1 TABLET(S): at 21:49

## 2021-05-12 NOTE — ED PROVIDER NOTE - OBJECTIVE STATEMENT
Patient noted what he thinks is an abscess on the back of his left calf. Got worse today. No fever. No drainage. Has had these before. No injury Patient noted what he thinks is an abscess on the back of his left thigh. Got worse today. No fever. No drainage. Has had these before. No injury

## 2021-05-12 NOTE — ED PROVIDER NOTE - NSFOLLOWUPINSTRUCTIONS_ED_ALL_ED_FT
Abscess    WHAT YOU NEED TO KNOW:    What is an abscess? An abscess is an area under the skin where pus (infected fluid) collects. An abscess is often caused by bacteria, fungi or other germs that get into an open wound. You can get an abscess anywhere on your body.    Skin Abscess         What increases my risk for an abscess?   •An animal bite      •A foreign object lodged under your skin      •Heavy or frequent sweating      •A health problem, such as diabetes or obesity      •Injecting illegal drugs      What are the signs and symptoms of an abscess? You may have a swollen mass that is red and painful. Pus may leak out of the mass. The pus will be white or yellow and may smell bad. You may have redness and pain days before the mass appears. You may have a fever and chills if the infection spreads.    How is an abscess diagnosed? Your healthcare provider will examine the area. He will check to see if your abscess is draining. A sample of fluid from your abscess may show what is causing your infection.    How is an abscess treated?   •Incision and drainage is a procedure used to remove pus and fluid from the abscess. Your healthcare provider will make a cut in the abscess so it can drain. Then gauze will be put into the wound and it will be covered with a bandage.      •Surgery may be needed to remove your abscess. Your healthcare provider may do this if the abscess is on your hands or buttocks. Surgery can decrease the risk that the abscess will come back.      What can I do to care for myself?   •Apply a warm compress to your abscess. This will help it open and drain. Wet a washcloth in warm, but not hot, water. Apply the compress for 10 minutes. Repeat this 4 times each day. Do not press on an abscess or try to open it with a needle. You may push the bacteria deeper or into your blood.      •Do not share your clothes, towels, or sheets with anyone. This can spread the infection to others.      •Wash your hands often. This can help prevent the spread of germs. Use soap and water or an alcohol-based hand rub.  Handwashing           What can I do to care for my wound after it is drained?   •Care for your wound as directed. If your healthcare provider says it is okay, carefully remove the bandage and gauze packing. You may need to soak the gauze to get it out of your wound. Clean your wound and the area around it as directed. Dry the area and put on new, clean bandages. Change your bandages when they get wet or dirty.      •Ask your healthcare provider how to change the gauze in your wound. Keep track of how many pieces of gauze are placed inside the wound. Do not put too much packing in the wound. Do not pack the gauze too tightly in your wound.      When should I seek immediate care?   •The area around your abscess becomes very painful, warm, or has red streaks.      •You have a fever and chills.      •Your heart is beating faster than usual.      •You feel faint or confused.      When should I call my doctor?   •Your abscess gets bigger.      •Your abscess returns.      •You have questions or concerns about your condition or care.      CARE AGREEMENT:    You have the right to help plan your care. Learn about your health condition and how it may be treated. Discuss treatment options with your healthcare providers to decide what care you want to receive. You always have the right to refuse treatment.      Return here in 2 days for wound check

## 2021-05-12 NOTE — ED PROVIDER NOTE - PATIENT PORTAL LINK FT
You can access the FollowMyHealth Patient Portal offered by [facility] by registering at the following website: [email or web address]. By joining [facility]’s FollowMyHealth portal, you will also be able to view your health information using other applications (apps) compatible with our system.

## 2021-05-14 ENCOUNTER — EMERGENCY (EMERGENCY)
Facility: HOSPITAL | Age: 41
LOS: 1 days | Discharge: HOME | End: 2021-05-14
Attending: EMERGENCY MEDICINE | Admitting: EMERGENCY MEDICINE
Payer: COMMERCIAL

## 2021-05-14 VITALS
TEMPERATURE: 98 F | SYSTOLIC BLOOD PRESSURE: 152 MMHG | WEIGHT: 190.04 LBS | DIASTOLIC BLOOD PRESSURE: 93 MMHG | RESPIRATION RATE: 16 BRPM | OXYGEN SATURATION: 97 % | HEIGHT: 72 IN | HEART RATE: 76 BPM

## 2021-05-14 PROCEDURE — 99282 EMERGENCY DEPT VISIT SF MDM: CPT

## 2021-05-14 NOTE — ED PROVIDER NOTE - ATTENDING CONTRIBUTION TO CARE
Pt is a 40 yo male who presents to the ED for a cc of wound check. PMHx of HTN. Pt was initially seem in the ED on 5/12. At that time he had presented to the ED with cc of abscess to posterior aspect of left thigh. Reported onset 3 days prior to that visit. Pt underwent I&D, wound was packed and he was placed on po Bactrim. He reports today for wound check. Pt reports that he has been taking his abx, denies excessive drainage from wound. Reports overall improvement in discomfort. Denies noting fever, chills, N/V, CP, SOB. Denies numbness or tingling to ext. On exam pt lying prone. Healing abscess left posterior thigh 2 cm diameter circular, minimal surrounding erythema, no warmth, no purulent drainage, no streaking. NVI to LLE. Pt presenting for wound check appears to be well healing, culture results not back at this time. Will remove packing, wash out abscess. Pt instructed to continue current abx.

## 2021-05-14 NOTE — ED PROVIDER NOTE - NSFOLLOWUPINSTRUCTIONS_ED_ALL_ED_FT
Keep area clean and dry. Wash 1-2 times daily gently as discussed. Keep covered with sterile gauze. Follow up with your PCP in 2 days for wound check. Return to ED immediately for new or worsening symptoms.      ABSCESS - General Information           Abscess    WHAT YOU NEED TO KNOW:    What is an abscess? An abscess is an area under the skin where pus (infected fluid) collects. An abscess is often caused by bacteria, fungi or other germs that get into an open wound. You can get an abscess anywhere on your body.    Skin Abscess         What increases my risk for an abscess?   •An animal bite      •A foreign object lodged under your skin      •Heavy or frequent sweating      •A health problem, such as diabetes or obesity      •Injecting illegal drugs      What are the signs and symptoms of an abscess? You may have a swollen mass that is red and painful. Pus may leak out of the mass. The pus will be white or yellow and may smell bad. You may have redness and pain days before the mass appears. You may have a fever and chills if the infection spreads.    How is an abscess diagnosed? Your healthcare provider will examine the area. He will check to see if your abscess is draining. A sample of fluid from your abscess may show what is causing your infection.    How is an abscess treated?   •Incision and drainage is a procedure used to remove pus and fluid from the abscess. Your healthcare provider will make a cut in the abscess so it can drain. Then gauze will be put into the wound and it will be covered with a bandage.      •Surgery may be needed to remove your abscess. Your healthcare provider may do this if the abscess is on your hands or buttocks. Surgery can decrease the risk that the abscess will come back.      What can I do to care for myself?   •Apply a warm compress to your abscess. This will help it open and drain. Wet a washcloth in warm, but not hot, water. Apply the compress for 10 minutes. Repeat this 4 times each day. Do not press on an abscess or try to open it with a needle. You may push the bacteria deeper or into your blood.      •Do not share your clothes, towels, or sheets with anyone. This can spread the infection to others.      •Wash your hands often. This can help prevent the spread of germs. Use soap and water or an alcohol-based hand rub.  Handwashing           What can I do to care for my wound after it is drained?   •Care for your wound as directed. If your healthcare provider says it is okay, carefully remove the bandage and gauze packing. You may need to soak the gauze to get it out of your wound. Clean your wound and the area around it as directed. Dry the area and put on new, clean bandages. Change your bandages when they get wet or dirty.      •Ask your healthcare provider how to change the gauze in your wound. Keep track of how many pieces of gauze are placed inside the wound. Do not put too much packing in the wound. Do not pack the gauze too tightly in your wound.      When should I seek immediate care?   •The area around your abscess becomes very painful, warm, or has red streaks.      •You have a fever and chills.      •Your heart is beating faster than usual.      •You feel faint or confused.      When should I call my doctor?   •Your abscess gets bigger.      •Your abscess returns.      •You have questions or concerns about your condition or care.      CARE AGREEMENT:    You have the right to help plan your care. Learn about your health condition and how it may be treated. Discuss treatment options with your healthcare providers to decide what care you want to receive. You always have the right to refuse treatment.        © Copyright [employer] 2021           back to top                          © Copyright [employer] 2021

## 2021-05-14 NOTE — ED PROVIDER NOTE - CLINICAL SUMMARY MEDICAL DECISION MAKING FREE TEXT BOX
40 yo M 2 days s/p I&D left posterior thigh abscess here for wound check--> wound healing well, decreased in size (compared to previous provider's note), no fever, no active drainage at time of exam // pending culture results---> pt instructed to continue with daily wound care and dressings changes, continue abx and pcp f/u in 2 days; pt verbalizes agreement and understanding of plan and ED return precautions, no emergent concerns at this time, pt stable for DC home

## 2021-05-14 NOTE — ED PROVIDER NOTE - SKIN WOUND TYPE
healing abscess left posterior thigh 2 cm diameter, minimal surrounding erythema, no warmth, no purulent drainage, no streaking/abscess(s)

## 2021-05-14 NOTE — ED PROVIDER NOTE - PATIENT PORTAL LINK FT
You can access the FollowMyHealth Patient Portal offered by [facility] by registering at the following website: [email or web address]. By joining [facility]’s FollowMyHealth portal, you will also be able to view your health information using other applications (apps) compatible with our system.

## 2021-05-14 NOTE — ED PROVIDER NOTE - OBJECTIVE STATEMENT
40 yo M PMHx HTN, 2 days s/p I&D of posterior L thigh abscess here for wound check. Pt states pain has improved, admits to daily wound care and dressing changes and reports abx tolerance (Bactrim). Pt denies fever/chills.

## 2021-05-15 LAB
-  AMPICILLIN/SULBACTAM: SIGNIFICANT CHANGE UP
-  CEFAZOLIN: SIGNIFICANT CHANGE UP
-  CLINDAMYCIN: SIGNIFICANT CHANGE UP
-  ERYTHROMYCIN: SIGNIFICANT CHANGE UP
-  GENTAMICIN: SIGNIFICANT CHANGE UP
-  OXACILLIN: SIGNIFICANT CHANGE UP
-  RIFAMPIN: SIGNIFICANT CHANGE UP
-  TETRACYCLINE: SIGNIFICANT CHANGE UP
-  TRIMETHOPRIM/SULFAMETHOXAZOLE: SIGNIFICANT CHANGE UP
-  VANCOMYCIN: SIGNIFICANT CHANGE UP
METHOD TYPE: SIGNIFICANT CHANGE UP

## 2021-05-18 LAB
CULTURE RESULTS: SIGNIFICANT CHANGE UP
ORGANISM # SPEC MICROSCOPIC CNT: SIGNIFICANT CHANGE UP
ORGANISM # SPEC MICROSCOPIC CNT: SIGNIFICANT CHANGE UP
SPECIMEN SOURCE: SIGNIFICANT CHANGE UP

## 2021-06-11 ENCOUNTER — NON-APPOINTMENT (OUTPATIENT)
Age: 41
End: 2021-06-11

## 2021-06-11 ENCOUNTER — APPOINTMENT (OUTPATIENT)
Dept: INTERNAL MEDICINE | Facility: CLINIC | Age: 41
End: 2021-06-11
Payer: COMMERCIAL

## 2021-06-11 VITALS
DIASTOLIC BLOOD PRESSURE: 82 MMHG | OXYGEN SATURATION: 98 % | HEART RATE: 66 BPM | TEMPERATURE: 97.8 F | BODY MASS INDEX: 26.6 KG/M2 | RESPIRATION RATE: 14 BRPM | SYSTOLIC BLOOD PRESSURE: 130 MMHG | HEIGHT: 71 IN | WEIGHT: 190 LBS

## 2021-06-11 DIAGNOSIS — Z78.9 OTHER SPECIFIED HEALTH STATUS: ICD-10-CM

## 2021-06-11 DIAGNOSIS — F17.200 NICOTINE DEPENDENCE, UNSPECIFIED, UNCOMPLICATED: ICD-10-CM

## 2021-06-11 PROCEDURE — 99072 ADDL SUPL MATRL&STAF TM PHE: CPT

## 2021-06-11 PROCEDURE — 99204 OFFICE O/P NEW MOD 45 MIN: CPT

## 2021-06-11 NOTE — HISTORY OF PRESENT ILLNESS
[de-identified] : Here today to establish care.\par History of hypertension- stable on meds\par Pt feels well.

## 2021-10-16 ENCOUNTER — APPOINTMENT (OUTPATIENT)
Dept: INTERNAL MEDICINE | Facility: CLINIC | Age: 41
End: 2021-10-16

## 2022-01-18 ENCOUNTER — OUTPATIENT (OUTPATIENT)
Dept: OUTPATIENT SERVICES | Facility: HOSPITAL | Age: 42
LOS: 1 days | End: 2022-01-18
Payer: COMMERCIAL

## 2022-01-18 ENCOUNTER — APPOINTMENT (OUTPATIENT)
Dept: MRI IMAGING | Facility: CLINIC | Age: 42
End: 2022-01-18
Payer: COMMERCIAL

## 2022-01-18 DIAGNOSIS — R22.1 LOCALIZED SWELLING, MASS AND LUMP, NECK: ICD-10-CM

## 2022-01-18 PROCEDURE — 70542 MRI ORBIT/FACE/NECK W/DYE: CPT | Mod: 26

## 2022-01-18 PROCEDURE — 70542 MRI ORBIT/FACE/NECK W/DYE: CPT

## 2022-01-18 PROCEDURE — A9585: CPT

## 2022-02-24 ENCOUNTER — APPOINTMENT (OUTPATIENT)
Dept: INTERNAL MEDICINE | Facility: CLINIC | Age: 42
End: 2022-02-24
Payer: COMMERCIAL

## 2022-02-24 VITALS
TEMPERATURE: 97.6 F | HEIGHT: 71 IN | BODY MASS INDEX: 25.9 KG/M2 | SYSTOLIC BLOOD PRESSURE: 132 MMHG | RESPIRATION RATE: 14 BRPM | DIASTOLIC BLOOD PRESSURE: 80 MMHG | WEIGHT: 185 LBS | OXYGEN SATURATION: 99 % | HEART RATE: 68 BPM

## 2022-02-24 PROCEDURE — 99213 OFFICE O/P EST LOW 20 MIN: CPT

## 2022-02-25 LAB
25(OH)D3 SERPL-MCNC: 35.6 NG/ML
ALBUMIN SERPL ELPH-MCNC: 4.8 G/DL
ALP BLD-CCNC: 43 U/L
ALT SERPL-CCNC: 16 U/L
ANION GAP SERPL CALC-SCNC: 12 MMOL/L
APPEARANCE: CLEAR
AST SERPL-CCNC: 24 U/L
BACTERIA: NEGATIVE
BASOPHILS # BLD AUTO: 0.06 K/UL
BASOPHILS NFR BLD AUTO: 0.9 %
BILIRUB SERPL-MCNC: 0.9 MG/DL
BILIRUBIN URINE: NEGATIVE
BLOOD URINE: NEGATIVE
BUN SERPL-MCNC: 13 MG/DL
CALCIUM SERPL-MCNC: 9.7 MG/DL
CHLORIDE SERPL-SCNC: 102 MMOL/L
CHOLEST SERPL-MCNC: 183 MG/DL
CO2 SERPL-SCNC: 26 MMOL/L
COLOR: COLORLESS
CREAT SERPL-MCNC: 1.14 MG/DL
EOSINOPHIL # BLD AUTO: 0.2 K/UL
EOSINOPHIL NFR BLD AUTO: 3.1 %
ESTIMATED AVERAGE GLUCOSE: 105 MG/DL
FOLATE SERPL-MCNC: 3.6 NG/ML
GLUCOSE QUALITATIVE U: NEGATIVE
GLUCOSE SERPL-MCNC: 99 MG/DL
HBA1C MFR BLD HPLC: 5.3 %
HCT VFR BLD CALC: 46.7 %
HDLC SERPL-MCNC: 85 MG/DL
HGB BLD-MCNC: 15.9 G/DL
HYALINE CASTS: 0 /LPF
IMM GRANULOCYTES NFR BLD AUTO: 0.5 %
KETONES URINE: NEGATIVE
LDLC SERPL CALC-MCNC: 86 MG/DL
LEUKOCYTE ESTERASE URINE: NEGATIVE
LYMPHOCYTES # BLD AUTO: 1.6 K/UL
LYMPHOCYTES NFR BLD AUTO: 24.7 %
MAN DIFF?: NORMAL
MCHC RBC-ENTMCNC: 32 PG
MCHC RBC-ENTMCNC: 34 GM/DL
MCV RBC AUTO: 94 FL
MICROSCOPIC-UA: NORMAL
MONOCYTES # BLD AUTO: 0.61 K/UL
MONOCYTES NFR BLD AUTO: 9.4 %
NEUTROPHILS # BLD AUTO: 3.97 K/UL
NEUTROPHILS NFR BLD AUTO: 61.4 %
NITRITE URINE: NEGATIVE
NONHDLC SERPL-MCNC: 98 MG/DL
PH URINE: 6.5
PLATELET # BLD AUTO: 277 K/UL
POTASSIUM SERPL-SCNC: 4.8 MMOL/L
PROT SERPL-MCNC: 7.1 G/DL
PROTEIN URINE: NEGATIVE
RBC # BLD: 4.97 M/UL
RBC # FLD: 12.4 %
RED BLOOD CELLS URINE: 0 /HPF
SODIUM SERPL-SCNC: 140 MMOL/L
SPECIFIC GRAVITY URINE: 1
SQUAMOUS EPITHELIAL CELLS: 0 /HPF
TRIGL SERPL-MCNC: 62 MG/DL
TSH SERPL-ACNC: 1.36 UIU/ML
UROBILINOGEN URINE: NORMAL
VIT B12 SERPL-MCNC: 508 PG/ML
WBC # FLD AUTO: 6.47 K/UL
WHITE BLOOD CELLS URINE: 0 /HPF

## 2022-03-07 ENCOUNTER — APPOINTMENT (OUTPATIENT)
Dept: OTOLARYNGOLOGY | Facility: CLINIC | Age: 42
End: 2022-03-07
Payer: COMMERCIAL

## 2022-03-07 VITALS
BODY MASS INDEX: 25.9 KG/M2 | WEIGHT: 185 LBS | SYSTOLIC BLOOD PRESSURE: 132 MMHG | HEIGHT: 71 IN | HEART RATE: 61 BPM | DIASTOLIC BLOOD PRESSURE: 90 MMHG

## 2022-03-07 PROCEDURE — 31575 DIAGNOSTIC LARYNGOSCOPY: CPT

## 2022-03-07 PROCEDURE — 99024 POSTOP FOLLOW-UP VISIT: CPT

## 2022-03-07 NOTE — PROCEDURE
[Gag Reflex] : gag reflex preventing mirror examination [None] : none [Flexible Endoscope] : examined with the flexible endoscope [Serial Number: ___] : Serial Number: [unfilled] [de-identified] : No lesions in the NPx, OPx, HPx or larynx.  VC are mobile, airway patent.\par

## 2022-03-07 NOTE — PHYSICAL EXAM
[Laryngoscopy Performed] : laryngoscopy was performed, see procedure section for findings [FreeTextEntry1] : Asymmetric fullness along the L. anterior FOM but is completely soft on bimanual palpation.  No TTP.  Good salivary flow from Amanda's duct.  No concerning lesions in the OC/OPx on inspection or palpation.\par  [Normal] : no rashes

## 2022-03-07 NOTE — HISTORY OF PRESENT ILLNESS
[de-identified] : pt has hx of chronic reflux and clearing throat and chronic fullness to the left FOM over 10 yrs and, ct of neck at [facility] >10 yrs ago and  MRI  in 2019 normal. pt manny to ENT for routine  follow and referred by Dr. Ingram for neck mass, ? ranula.  MRI on 1/2022 Mild asymmetric enlargement of the left sublingual gland compared to the right is noted at the level of the floor the mouth, however there is no evidence for underlying focal discrete lesion. No fluid collections involve the floor of mouth (no MRI evidence for ranula).  Nonspecific cervical lymph nodes are noted with distribution as described\par pt states left side floor more swollen for many years and mild increase in size, no drainage or pain. \par Denies pain, dysphagia, odynophagia, dyspnea, dysphonia, nasal obstruction/bleeding, fever, weakness or weight loss.\par + smoker quit recently 1/2022

## 2022-03-20 NOTE — ED PROVIDER NOTE - NS ED ROS FT
Pt here for evaluation of R lower back pain that radiates to R leg, has tingling to R foot x yesterday. Denies injury but states pain started after laying down more than normal for 2 days due to having an upset stomach, which has now resolved. States he did have similar pain in the past years ago and was told he had \"disc problems\" but no pain since. Denies any urinary symptoms. GEN: no fever, no chills, no weakness  HENT: no eye pain, no visual changes, no ear pain, no visual or hearing changes, no sore throat, no swelling or neck pain  CV: no chest pain, no palpitations, no dizziness, no swelling  RESP: no coughing, no sob, no IWOB, no BARRIOS  GI: no abd pain, no distension, no nausea, no vomiting, no diarrhea, no constipation  : no dysuria,  no frequency, no hematuria, no discharge, no flank pain  MUSCULOSKELETAL: no myalgia, no arthralgia, no joint swelling, no bruising   SKIN: no rash, no wounds, no itching  NEURO: no change in mentation, no visual changes, no HA, no focal weakness, no trouble speaking, no gait abnormalities, no dizziness  PSYCH: no suidical ideation, no homicidal ideation, no depression, no anxiety, no hallucinations

## 2022-04-11 ENCOUNTER — TRANSCRIPTION ENCOUNTER (OUTPATIENT)
Age: 42
End: 2022-04-11

## 2022-06-13 ENCOUNTER — APPOINTMENT (OUTPATIENT)
Dept: OTOLARYNGOLOGY | Facility: CLINIC | Age: 42
End: 2022-06-13

## 2022-08-08 ENCOUNTER — APPOINTMENT (OUTPATIENT)
Dept: OTOLARYNGOLOGY | Facility: CLINIC | Age: 42
End: 2022-08-08

## 2022-08-08 VITALS
SYSTOLIC BLOOD PRESSURE: 147 MMHG | WEIGHT: 185 LBS | HEART RATE: 55 BPM | BODY MASS INDEX: 25.06 KG/M2 | HEIGHT: 72 IN | DIASTOLIC BLOOD PRESSURE: 98 MMHG | OXYGEN SATURATION: 98 %

## 2022-08-08 PROCEDURE — 76536 US EXAM OF HEAD AND NECK: CPT

## 2022-08-08 PROCEDURE — 99213 OFFICE O/P EST LOW 20 MIN: CPT | Mod: 25

## 2022-08-08 RX ORDER — MUPIROCIN 20 MG/G
2 OINTMENT TOPICAL
Qty: 22 | Refills: 0 | Status: COMPLETED | COMMUNITY
Start: 2022-03-09

## 2022-08-08 RX ORDER — EPINEPHRINE 0.3 MG/.3ML
0.3 INJECTION, SOLUTION INTRAMUSCULAR
Qty: 2 | Refills: 0 | Status: ACTIVE | COMMUNITY
Start: 2022-06-30

## 2022-08-08 RX ORDER — DOXYCYCLINE HYCLATE 100 MG/1
100 CAPSULE ORAL
Qty: 28 | Refills: 0 | Status: COMPLETED | COMMUNITY
Start: 2022-03-09

## 2022-08-08 NOTE — HISTORY OF PRESENT ILLNESS
[de-identified] : pt has hx of chronic reflux and clearing throat and chronic fullness to the left FOM over 10 yrs and, ct of neck at [facility] >10 yrs ago and  MRI  in 2019 normal. pt manny to ENT for routine follow and referred by Dr. Ingram for neck mass, ? ranula.  MRI on 1/2022 Mild asymmetric enlargement of the left sublingual gland compared to the right is noted at the level of the floor the mouth, however there is no evidence for underlying focal discrete lesion. No fluid collections involve the floor of mouth (no MRI evidence for ranula).  Nonspecific cervical lymph nodes are noted with distribution as described\par pt states left side floor more swollen for many years and mild increase in size, no drainage or pain. pt is here for f.u, no change or new c.o. Denies pain, dysphagia, odynophagia, dyspnea, dysphonia, nasal obstruction/bleeding, fever, weakness or weight loss.\par + smoker quit recently 1/2022

## 2022-08-08 NOTE — PHYSICAL EXAM
[Laryngoscopy Performed] : laryngoscopy was performed, see procedure section for findings [Normal] : no rashes [FreeTextEntry1] : Asymmetric fullness along the L. anterior FOM but is completely soft on bimanual palpation.  No TTP.  Good salivary flow from Amanda's duct.  No concerning lesions in the OC/OPx on inspection or palpation.\par

## 2022-08-08 NOTE — DATA REVIEWED
[de-identified] : US today with L. periparotid LNs measuring 0.97 and 0.91 cm each in largest dimension with completely normal morphology.  A left level IB LN measures 1.01 cm but has a fatty hilum and a left level IIa LN measures 1.14 cm with a normal fatty hilum.  No pathologic LAD is noted in the left neck.

## 2022-08-29 ENCOUNTER — APPOINTMENT (OUTPATIENT)
Dept: ORTHOPEDIC SURGERY | Facility: CLINIC | Age: 42
End: 2022-08-29

## 2022-09-07 ENCOUNTER — APPOINTMENT (OUTPATIENT)
Dept: ORTHOPEDIC SURGERY | Facility: CLINIC | Age: 42
End: 2022-09-07

## 2022-09-07 VITALS — HEIGHT: 72 IN | WEIGHT: 185 LBS | BODY MASS INDEX: 25.06 KG/M2

## 2022-09-07 DIAGNOSIS — Z78.9 OTHER SPECIFIED HEALTH STATUS: ICD-10-CM

## 2022-09-07 DIAGNOSIS — M23.92 UNSPECIFIED INTERNAL DERANGEMENT OF LEFT KNEE: ICD-10-CM

## 2022-09-07 PROCEDURE — 73564 X-RAY EXAM KNEE 4 OR MORE: CPT | Mod: LT

## 2022-09-07 PROCEDURE — 99203 OFFICE O/P NEW LOW 30 MIN: CPT

## 2022-09-07 NOTE — ASSESSMENT
[FreeTextEntry1] : 42m L knee pain and instability - poss LMT \par \par 1) MRI L knee, eval meniscal tear. \par 2) Rest, activity mod, cryotherapy\par 3) rtc after MRI

## 2022-09-07 NOTE — HISTORY OF PRESENT ILLNESS
[de-identified] : 9/7/22: 42M who presents today with left knee pain that has been occurring since 8/1/22. No distinct injury. Noticed a gradual onset of pain over lateral joint line that radiates to the posterior knee. Yesterday on 9/6 he was kickboxing and felt an increase in pain. Notes buckling/ locking sx. Was kicking with Right leg with LEft leg planted. Has been modifying activity since re-injury. Has no hx of knee pain. No prior treatment. \par \par Desk job -auditing

## 2022-09-09 ENCOUNTER — APPOINTMENT (OUTPATIENT)
Dept: INTERNAL MEDICINE | Facility: CLINIC | Age: 42
End: 2022-09-09

## 2022-09-09 ENCOUNTER — NON-APPOINTMENT (OUTPATIENT)
Age: 42
End: 2022-09-09

## 2022-09-09 VITALS
HEIGHT: 72 IN | OXYGEN SATURATION: 99 % | WEIGHT: 185 LBS | RESPIRATION RATE: 14 BRPM | HEART RATE: 64 BPM | DIASTOLIC BLOOD PRESSURE: 80 MMHG | SYSTOLIC BLOOD PRESSURE: 130 MMHG | BODY MASS INDEX: 25.06 KG/M2

## 2022-09-09 PROCEDURE — 99396 PREV VISIT EST AGE 40-64: CPT

## 2022-09-10 LAB
25(OH)D3 SERPL-MCNC: 49.7 NG/ML
ALBUMIN SERPL ELPH-MCNC: 4.7 G/DL
ALP BLD-CCNC: 56 U/L
ALT SERPL-CCNC: 19 U/L
ANION GAP SERPL CALC-SCNC: 15 MMOL/L
APPEARANCE: CLEAR
AST SERPL-CCNC: 28 U/L
BACTERIA: NEGATIVE
BASOPHILS # BLD AUTO: 0.02 K/UL
BASOPHILS NFR BLD AUTO: 0.4 %
BILIRUB SERPL-MCNC: 0.9 MG/DL
BILIRUBIN URINE: NEGATIVE
BLOOD URINE: NEGATIVE
BUN SERPL-MCNC: 7 MG/DL
CALCIUM SERPL-MCNC: 9.5 MG/DL
CHLORIDE SERPL-SCNC: 104 MMOL/L
CHOLEST SERPL-MCNC: 148 MG/DL
CO2 SERPL-SCNC: 22 MMOL/L
COLOR: NORMAL
CREAT SERPL-MCNC: 1.08 MG/DL
EGFR: 88 ML/MIN/1.73M2
EOSINOPHIL # BLD AUTO: 0.1 K/UL
EOSINOPHIL NFR BLD AUTO: 2 %
ESTIMATED AVERAGE GLUCOSE: 105 MG/DL
FOLATE SERPL-MCNC: 6.2 NG/ML
GLUCOSE QUALITATIVE U: NEGATIVE
GLUCOSE SERPL-MCNC: 85 MG/DL
HBA1C MFR BLD HPLC: 5.3 %
HCT VFR BLD CALC: 42.9 %
HDLC SERPL-MCNC: 80 MG/DL
HGB BLD-MCNC: 14.2 G/DL
HYALINE CASTS: 0 /LPF
IMM GRANULOCYTES NFR BLD AUTO: 0.4 %
KETONES URINE: NEGATIVE
LDLC SERPL CALC-MCNC: 52 MG/DL
LEUKOCYTE ESTERASE URINE: NEGATIVE
LYMPHOCYTES # BLD AUTO: 1.24 K/UL
LYMPHOCYTES NFR BLD AUTO: 24.7 %
MAN DIFF?: NORMAL
MCHC RBC-ENTMCNC: 31.4 PG
MCHC RBC-ENTMCNC: 33.1 GM/DL
MCV RBC AUTO: 94.9 FL
MICROSCOPIC-UA: NORMAL
MONOCYTES # BLD AUTO: 0.53 K/UL
MONOCYTES NFR BLD AUTO: 10.5 %
NEUTROPHILS # BLD AUTO: 3.12 K/UL
NEUTROPHILS NFR BLD AUTO: 62 %
NITRITE URINE: NEGATIVE
NONHDLC SERPL-MCNC: 68 MG/DL
PH URINE: 6.5
PLATELET # BLD AUTO: 281 K/UL
POTASSIUM SERPL-SCNC: 4.4 MMOL/L
PROT SERPL-MCNC: 6.8 G/DL
PROTEIN URINE: NEGATIVE
RBC # BLD: 4.52 M/UL
RBC # FLD: 12.3 %
RED BLOOD CELLS URINE: 0 /HPF
SODIUM SERPL-SCNC: 141 MMOL/L
SPECIFIC GRAVITY URINE: 1.01
SQUAMOUS EPITHELIAL CELLS: 0 /HPF
TRIGL SERPL-MCNC: 83 MG/DL
TSH SERPL-ACNC: 1.6 UIU/ML
UROBILINOGEN URINE: NORMAL
VIT B12 SERPL-MCNC: 465 PG/ML
WBC # FLD AUTO: 5.03 K/UL
WHITE BLOOD CELLS URINE: 0 /HPF

## 2022-09-13 ENCOUNTER — FORM ENCOUNTER (OUTPATIENT)
Age: 42
End: 2022-09-13

## 2022-09-13 ENCOUNTER — TRANSCRIPTION ENCOUNTER (OUTPATIENT)
Age: 42
End: 2022-09-13

## 2022-09-14 ENCOUNTER — APPOINTMENT (OUTPATIENT)
Dept: MRI IMAGING | Facility: CLINIC | Age: 42
End: 2022-09-14
Payer: COMMERCIAL

## 2022-09-14 PROCEDURE — 73221 MRI JOINT UPR EXTREM W/O DYE: CPT | Mod: LT

## 2022-09-20 ENCOUNTER — APPOINTMENT (OUTPATIENT)
Dept: ORTHOPEDIC SURGERY | Facility: CLINIC | Age: 42
End: 2022-09-20

## 2022-09-20 VITALS — WEIGHT: 185 LBS | HEIGHT: 72 IN | BODY MASS INDEX: 25.06 KG/M2

## 2022-09-20 PROCEDURE — 99214 OFFICE O/P EST MOD 30 MIN: CPT

## 2022-09-20 NOTE — DATA REVIEWED
[MRI] : MRI [Left] : left [Knee] : knee [Report was reviewed and noted in the chart] : The report was reviewed and noted in the chart [I independently reviewed and interpreted images and report] : I independently reviewed and interpreted images and report [I reviewed the films/CD] : I reviewed the films/CD [FreeTextEntry1] : 09.14.22\par 1. Nonspecific marrow edema measuring approximately 4 cm in the central to posterior lateral femoral condyle with slight subchondral sclerosis centrally without collapse, full-thickness chondral defect or lateral meniscal tear potentially related to transient marrow ischemia/regional osteoporosis, stress reaction or potentially noncollapsed AVN/subchondral fracture.\par 2. Mild ACL sprain.\par 3. Possible slight contusion or stress reaction measuring 1 cm in the peripheral anterior medial tibial plateau and mild effusion with moderate infrapatellar synovitis.\par 4. No evidence of meniscal tear or loose body.\par 5. Clinical correlation and follow up is recommended. Consider repeat MRI to further evaluate for healing.

## 2022-09-20 NOTE — HISTORY OF PRESENT ILLNESS
[3] : 3 [Dull/Aching] : dull/aching [Intermittent] : intermittent [Ice] : ice [de-identified] : 9/20/22: Here to f/up left knee and review MRI results. Reports that he has seen some improvement since last visit. Pain aggravated with increased activity. \par 9/7/22: 42M who presents today with left knee pain that has been occurring since 8/1/22. No distinct injury. Noticed a gradual onset of pain over lateral joint line that radiates to the posterior knee. Yesterday on 9/6 he was kickboxing and felt an increase in pain. Notes buckling/ locking sx. Was kicking with Right leg with LEft leg planted. Has been modifying activity since re-injury. Has no hx of knee pain. No prior treatment. 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

## 2022-10-19 ENCOUNTER — APPOINTMENT (OUTPATIENT)
Dept: ORTHOPEDIC SURGERY | Facility: CLINIC | Age: 42
End: 2022-10-19

## 2022-10-19 VITALS — WEIGHT: 185 LBS | BODY MASS INDEX: 25.06 KG/M2 | HEIGHT: 72 IN

## 2022-10-19 DIAGNOSIS — M84.352A STRESS FRACTURE, LEFT FEMUR, INITIAL ENCOUNTER FOR FRACTURE: ICD-10-CM

## 2022-10-19 PROCEDURE — 99213 OFFICE O/P EST LOW 20 MIN: CPT

## 2022-10-19 NOTE — PHYSICAL EXAM
[Left] : left knee [NL (140)] : flexion 140 degrees [NL (0)] : extension 0 degrees [] : no anterior pain with squatting

## 2022-10-19 NOTE — HISTORY OF PRESENT ILLNESS
[3] : 3 [Dull/Aching] : dull/aching [Intermittent] : intermittent [Ice] : ice [de-identified] : 10/19/22: Here for left knee follow-up. Doing well. No complaints of pain. \par 9/20/22: Here to f/up left knee and review MRI results. Reports that he has seen some improvement since last visit. Pain aggravated with increased activity. \par 9/7/22: 42M who presents today with left knee pain that has been occurring since 8/1/22. No distinct injury. Noticed a gradual onset of pain over lateral joint line that radiates to the posterior knee. Yesterday on 9/6 he was kickboxing and felt an increase in pain. Notes buckling/ locking sx. Was kicking with Right leg with LEft leg planted. Has been modifying activity since re-injury. Has no hx of knee pain. No prior treatment. 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

## 2022-10-19 NOTE — DISCUSSION/SUMMARY
[de-identified] : 42m with stress fracture LFC\par 1) cryotherapy, rest and activity / exercise  modification\par 2) physical therapy \par 3) rtc 6 weeks for re-eval, will consider repeat MRI  eval healing\par 4) multivitamin, calcium / vitamin D\par \par Entered by Rae Vu acting as scribe.\par

## 2022-11-30 ENCOUNTER — APPOINTMENT (OUTPATIENT)
Dept: ORTHOPEDIC SURGERY | Facility: CLINIC | Age: 42
End: 2022-11-30

## 2023-01-30 ENCOUNTER — APPOINTMENT (OUTPATIENT)
Dept: INTERNAL MEDICINE | Facility: CLINIC | Age: 43
End: 2023-01-30
Payer: COMMERCIAL

## 2023-01-30 ENCOUNTER — APPOINTMENT (OUTPATIENT)
Dept: RADIOLOGY | Facility: CLINIC | Age: 43
End: 2023-01-30
Payer: COMMERCIAL

## 2023-01-30 ENCOUNTER — OUTPATIENT (OUTPATIENT)
Dept: OUTPATIENT SERVICES | Facility: HOSPITAL | Age: 43
LOS: 1 days | End: 2023-01-30
Payer: COMMERCIAL

## 2023-01-30 VITALS
SYSTOLIC BLOOD PRESSURE: 124 MMHG | OXYGEN SATURATION: 99 % | TEMPERATURE: 98.7 F | HEART RATE: 74 BPM | RESPIRATION RATE: 14 BRPM | BODY MASS INDEX: 24.38 KG/M2 | DIASTOLIC BLOOD PRESSURE: 80 MMHG | HEIGHT: 72 IN | WEIGHT: 180 LBS

## 2023-01-30 DIAGNOSIS — R22.0 LOCALIZED SWELLING, MASS AND LUMP, HEAD: ICD-10-CM

## 2023-01-30 DIAGNOSIS — R59.0 LOCALIZED ENLARGED LYMPH NODES: ICD-10-CM

## 2023-01-30 DIAGNOSIS — R61 GENERALIZED HYPERHIDROSIS: ICD-10-CM

## 2023-01-30 PROCEDURE — 71046 X-RAY EXAM CHEST 2 VIEWS: CPT | Mod: 26

## 2023-01-30 PROCEDURE — 99214 OFFICE O/P EST MOD 30 MIN: CPT

## 2023-01-30 PROCEDURE — 71046 X-RAY EXAM CHEST 2 VIEWS: CPT

## 2023-01-30 NOTE — ASSESSMENT
[FreeTextEntry1] : Night sweats: Check labs to help rule out TB, hyperthyroidism, leukocytosis. Check CXR to help rule out mass or PNA. Will disucss results. \par

## 2023-01-30 NOTE — PHYSICAL EXAM
[No Acute Distress] : no acute distress [Normal Sclera/Conjunctiva] : normal sclera/conjunctiva [PERRL] : pupils equal round and reactive to light [EOMI] : extraocular movements intact [Normal TMs] : both tympanic membranes were normal [No Lymphadenopathy] : no lymphadenopathy [Supple] : supple [No Respiratory Distress] : no respiratory distress  [No Accessory Muscle Use] : no accessory muscle use [Clear to Auscultation] : lungs were clear to auscultation bilaterally [Normal Rate] : normal rate  [Regular Rhythm] : with a regular rhythm [Normal S1, S2] : normal S1 and S2 [Soft] : abdomen soft [Non Tender] : non-tender [Non-distended] : non-distended [Normal Bowel Sounds] : normal bowel sounds

## 2023-01-30 NOTE — REVIEW OF SYSTEMS
[Fever] : no fever [Chills] : no chills [Earache] : no earache [Nasal Discharge] : no nasal discharge [Sore Throat] : no sore throat [Chest Pain] : no chest pain [Palpitations] : no palpitations [Lower Ext Edema] : no lower extremity edema [Shortness Of Breath] : no shortness of breath [Wheezing] : no wheezing [Cough] : no cough [Dyspnea on Exertion] : not dyspnea on exertion [Abdominal Pain] : no abdominal pain [Nausea] : no nausea [Constipation] : no constipation [Diarrhea] : no diarrhea [Vomiting] : no vomiting [Dysuria] : no dysuria

## 2023-01-30 NOTE — HISTORY OF PRESENT ILLNESS
[FreeTextEntry8] : 43M presents for rare night sweats over the past two months. Denies fever, chills, sore throat, cough, swollen glands, weight loss. Recently quit smoking but was 1ppd for 20 yrs or more. \par \par Of note, had COVID in August.

## 2023-02-02 LAB
ALBUMIN SERPL ELPH-MCNC: 4.6 G/DL
ALP BLD-CCNC: 57 U/L
ALT SERPL-CCNC: 15 U/L
ANION GAP SERPL CALC-SCNC: 13 MMOL/L
AST SERPL-CCNC: 22 U/L
BASOPHILS # BLD AUTO: 0.04 K/UL
BASOPHILS NFR BLD AUTO: 0.7 %
BILIRUB SERPL-MCNC: 0.5 MG/DL
BUN SERPL-MCNC: 12 MG/DL
CALCIUM SERPL-MCNC: 9.7 MG/DL
CHLORIDE SERPL-SCNC: 103 MMOL/L
CO2 SERPL-SCNC: 26 MMOL/L
CREAT SERPL-MCNC: 1.02 MG/DL
EGFR: 94 ML/MIN/1.73M2
EOSINOPHIL # BLD AUTO: 0.44 K/UL
EOSINOPHIL NFR BLD AUTO: 8.1 %
GLUCOSE SERPL-MCNC: 120 MG/DL
HCT VFR BLD CALC: 44.3 %
HGB BLD-MCNC: 14.5 G/DL
IMM GRANULOCYTES NFR BLD AUTO: 0.4 %
LYMPHOCYTES # BLD AUTO: 1.29 K/UL
LYMPHOCYTES NFR BLD AUTO: 23.8 %
M TB IFN-G BLD-IMP: NEGATIVE
MAN DIFF?: NORMAL
MCHC RBC-ENTMCNC: 32.7 GM/DL
MCHC RBC-ENTMCNC: 33.4 PG
MCV RBC AUTO: 102.1 FL
MONOCYTES # BLD AUTO: 0.45 K/UL
MONOCYTES NFR BLD AUTO: 8.3 %
NEUTROPHILS # BLD AUTO: 3.18 K/UL
NEUTROPHILS NFR BLD AUTO: 58.7 %
PLATELET # BLD AUTO: 256 K/UL
POTASSIUM SERPL-SCNC: 4.3 MMOL/L
PROT SERPL-MCNC: 6.4 G/DL
QUANTIFERON TB PLUS MITOGEN MINUS NIL: >10 IU/ML
QUANTIFERON TB PLUS NIL: 0.09 IU/ML
QUANTIFERON TB PLUS TB1 MINUS NIL: 0.07 IU/ML
QUANTIFERON TB PLUS TB2 MINUS NIL: 0.06 IU/ML
RBC # BLD: 4.34 M/UL
RBC # FLD: 12.8 %
SODIUM SERPL-SCNC: 142 MMOL/L
TSH SERPL-ACNC: 0.82 UIU/ML
WBC # FLD AUTO: 5.42 K/UL

## 2023-02-08 ENCOUNTER — APPOINTMENT (OUTPATIENT)
Dept: INTERNAL MEDICINE | Facility: CLINIC | Age: 43
End: 2023-02-08

## 2023-02-13 ENCOUNTER — APPOINTMENT (OUTPATIENT)
Dept: OTOLARYNGOLOGY | Facility: CLINIC | Age: 43
End: 2023-02-13

## 2023-02-13 ENCOUNTER — APPOINTMENT (OUTPATIENT)
Dept: ORTHOPEDIC SURGERY | Facility: CLINIC | Age: 43
End: 2023-02-13

## 2023-03-13 ENCOUNTER — APPOINTMENT (OUTPATIENT)
Dept: INTERNAL MEDICINE | Facility: CLINIC | Age: 43
End: 2023-03-13
Payer: COMMERCIAL

## 2023-03-13 VITALS
SYSTOLIC BLOOD PRESSURE: 112 MMHG | WEIGHT: 183 LBS | TEMPERATURE: 98.3 F | DIASTOLIC BLOOD PRESSURE: 82 MMHG | HEART RATE: 67 BPM | RESPIRATION RATE: 14 BRPM | OXYGEN SATURATION: 98 % | BODY MASS INDEX: 24.79 KG/M2 | HEIGHT: 72 IN

## 2023-03-13 PROCEDURE — 99214 OFFICE O/P EST MOD 30 MIN: CPT

## 2023-03-13 NOTE — PHYSICAL EXAM
[No Acute Distress] : no acute distress [Normal Sclera/Conjunctiva] : normal sclera/conjunctiva [PERRL] : pupils equal round and reactive to light [EOMI] : extraocular movements intact [No Lymphadenopathy] : no lymphadenopathy [Supple] : supple [No Respiratory Distress] : no respiratory distress  [No Accessory Muscle Use] : no accessory muscle use [Clear to Auscultation] : lungs were clear to auscultation bilaterally [Normal Rate] : normal rate  [Regular Rhythm] : with a regular rhythm [Normal S1, S2] : normal S1 and S2 [Soft] : abdomen soft [Non Tender] : non-tender [Non-distended] : non-distended [Normal Bowel Sounds] : normal bowel sounds [Normal Posterior Cervical Nodes] : no posterior cervical lymphadenopathy [Normal Anterior Cervical Nodes] : no anterior cervical lymphadenopathy

## 2023-03-13 NOTE — REVIEW OF SYSTEMS
[Fever] : no fever [Chills] : no chills [Night Sweats] : no night sweats [Earache] : no earache [Nasal Discharge] : no nasal discharge [Sore Throat] : no sore throat [Chest Pain] : no chest pain [Palpitations] : no palpitations [Lower Ext Edema] : no lower extremity edema [Shortness Of Breath] : no shortness of breath [Wheezing] : no wheezing [Cough] : no cough [Dyspnea on Exertion] : not dyspnea on exertion [Abdominal Pain] : no abdominal pain [Nausea] : no nausea [Constipation] : no constipation [Diarrhea] : no diarrhea [Vomiting] : no vomiting [Dysuria] : no dysuria [Easy Bleeding] : no easy bleeding [Easy Bruising] : no easy bruising [Swollen Glands] : no swollen glands

## 2023-03-13 NOTE — HISTORY OF PRESENT ILLNESS
[de-identified] : 43M presents for follow-up of night sweats. Reports that it happened again this morning but has not happened since last visit otherwise. Denies objective fever, swollen glands, weight loss. Reports that the sweating is not drenching sweats nightly. Denies N/V, anorexia. Of note, drinks 4 beers a night. \par

## 2023-03-13 NOTE — ASSESSMENT
[FreeTextEntry1] : Night sweats: Not drenching and not nightly. No objective fevers. No swollen glands on exam. CXR and quant gold in 1/2023 were normal. Check labs to help rule out infection. HE declined HIV. May be related to alcohol intake. Recommended he reduce intake. He declined referral to detox physician. \par

## 2023-03-15 LAB
ALBUMIN SERPL ELPH-MCNC: 5 G/DL
ALP BLD-CCNC: 56 U/L
ALT SERPL-CCNC: 11 U/L
ANA SER IF-ACNC: NEGATIVE
ANION GAP SERPL CALC-SCNC: 14 MMOL/L
APPEARANCE: CLEAR
AST SERPL-CCNC: 22 U/L
BACTERIA UR CULT: NORMAL
BACTERIA: NEGATIVE
BASOPHILS # BLD AUTO: 0.04 K/UL
BASOPHILS NFR BLD AUTO: 0.8 %
BILIRUB SERPL-MCNC: 0.9 MG/DL
BILIRUBIN URINE: NEGATIVE
BLOOD URINE: NEGATIVE
BUN SERPL-MCNC: 11 MG/DL
CALCIUM SERPL-MCNC: 10.3 MG/DL
CHLORIDE SERPL-SCNC: 102 MMOL/L
CO2 SERPL-SCNC: 26 MMOL/L
COLOR: COLORLESS
CREAT SERPL-MCNC: 0.98 MG/DL
CRP SERPL-MCNC: <3 MG/L
EGFR: 98 ML/MIN/1.73M2
EOSINOPHIL # BLD AUTO: 0.12 K/UL
EOSINOPHIL NFR BLD AUTO: 2.3 %
FOLATE SERPL-MCNC: >20 NG/ML
GLUCOSE QUALITATIVE U: NEGATIVE
GLUCOSE SERPL-MCNC: 110 MG/DL
HAV IGM SER QL: NONREACTIVE
HBV CORE IGM SER QL: NONREACTIVE
HBV SURFACE AG SER QL: NONREACTIVE
HCT VFR BLD CALC: 45.5 %
HCV AB SER QL: NONREACTIVE
HCV S/CO RATIO: 0.06 S/CO
HGB BLD-MCNC: 15.4 G/DL
HYALINE CASTS: 0 /LPF
IMM GRANULOCYTES NFR BLD AUTO: 0.4 %
KETONES URINE: NEGATIVE
LEUKOCYTE ESTERASE URINE: NEGATIVE
LYMPHOCYTES # BLD AUTO: 1.34 K/UL
LYMPHOCYTES NFR BLD AUTO: 26 %
MAN DIFF?: NORMAL
MCHC RBC-ENTMCNC: 32.8 PG
MCHC RBC-ENTMCNC: 33.8 GM/DL
MCV RBC AUTO: 96.8 FL
MICROSCOPIC-UA: NORMAL
MONOCYTES # BLD AUTO: 0.45 K/UL
MONOCYTES NFR BLD AUTO: 8.7 %
NEUTROPHILS # BLD AUTO: 3.19 K/UL
NEUTROPHILS NFR BLD AUTO: 61.8 %
NITRITE URINE: NEGATIVE
PH URINE: 7.5
PLATELET # BLD AUTO: 242 K/UL
POTASSIUM SERPL-SCNC: 5 MMOL/L
PROT SERPL-MCNC: 7.1 G/DL
PROTEIN URINE: NEGATIVE
RBC # BLD: 4.7 M/UL
RBC # FLD: 12.4 %
RED BLOOD CELLS URINE: 1 /HPF
SODIUM SERPL-SCNC: 142 MMOL/L
SPECIFIC GRAVITY URINE: 1.01
SQUAMOUS EPITHELIAL CELLS: 0 /HPF
UROBILINOGEN URINE: NORMAL
VIT B12 SERPL-MCNC: 452 PG/ML
WBC # FLD AUTO: 5.16 K/UL
WHITE BLOOD CELLS URINE: 0 /HPF

## 2023-03-20 LAB — BACTERIA BLD CULT: NORMAL

## 2023-05-19 NOTE — ED ADULT NURSE NOTE - TOBACCO USE
Spoke with patient, had previously left voicemail on my line. We discussed her questions, pt states that she has HPV (diagnosed outside) and is looking to establish care within [facility]. She has an appointment with Dr. Marcelino Dubois, confirmed with their office that she does treat HPV. She is unsure if she will need an oncologist. Encouraged patient to see Dr. Connie Reinoso on 6/6 as scheduled, and certainly if she needs to establish care with us, we would be happy to see her. Explained that our oncologists do go to the [place] location, and that would be closer to home, if she would like to do that. Explained that I am with the research department, and she can certainly call me back if she needs assistance, but did give patient the main cancer center phone # ([phone]), should she need to schedule a consultation. Pt verbalizes understanding of the plan.
Current some day smoker

## 2023-07-26 ENCOUNTER — RX RENEWAL (OUTPATIENT)
Age: 43
End: 2023-07-26

## 2023-08-03 ENCOUNTER — RX RENEWAL (OUTPATIENT)
Age: 43
End: 2023-08-03

## 2023-08-03 RX ORDER — FOLIC ACID 1 MG/1
1 TABLET ORAL
Qty: 90 | Refills: 1 | Status: ACTIVE | COMMUNITY
Start: 2022-02-25 | End: 1900-01-01

## 2023-09-08 ENCOUNTER — APPOINTMENT (OUTPATIENT)
Dept: ORTHOPEDIC SURGERY | Facility: CLINIC | Age: 43
End: 2023-09-08
Payer: COMMERCIAL

## 2023-09-08 VITALS — BODY MASS INDEX: 24.79 KG/M2 | HEIGHT: 72 IN | WEIGHT: 183 LBS

## 2023-09-08 DIAGNOSIS — M19.012 PRIMARY OSTEOARTHRITIS, LEFT SHOULDER: ICD-10-CM

## 2023-09-08 DIAGNOSIS — Z78.9 OTHER SPECIFIED HEALTH STATUS: ICD-10-CM

## 2023-09-08 DIAGNOSIS — Z86.79 PERSONAL HISTORY OF OTHER DISEASES OF THE CIRCULATORY SYSTEM: ICD-10-CM

## 2023-09-08 PROCEDURE — 73030 X-RAY EXAM OF SHOULDER: CPT | Mod: 50

## 2023-09-08 PROCEDURE — 99213 OFFICE O/P EST LOW 20 MIN: CPT

## 2023-09-08 PROCEDURE — 73010 X-RAY EXAM OF SHOULDER BLADE: CPT | Mod: 50

## 2023-09-08 NOTE — IMAGING
[Bilateral] : shoulder bilaterally [AC Joint Arthrosis] : AC Joint Arthrosis [There are no fractures, subluxations or dislocations. No significant abnormalities are seen] : There are no fractures, subluxations or dislocations. No significant abnormalities are seen

## 2023-09-08 NOTE — REASON FOR VISIT
[FreeTextEntry2] : 09/08/2023 :ROSALEE ROMERO , a 43 year old male, presents today for bilateral shoulders, on and off for 4 month onset. Left worst than right

## 2023-09-08 NOTE — DISCUSSION/SUMMARY
[de-identified] : 43m with left ac joint djd 1) discussed the availability of csi  2) discussed activity / exercise modification 3) home exercsies 4) rtc prn   Entered by Rae Vu acting as scribe. Dr. Donaldson- The documentation recorded by the scribe accurately reflects the service I personally performed and the decisions made by me.

## 2023-09-08 NOTE — HISTORY OF PRESENT ILLNESS
[Dull/Aching] : dull/aching [Sharp] : sharp [4] : 4 [0] : 0 [Localized] : localized [Intermittent] : intermittent [Household chores] : household chores [Leisure] : leisure [Work] : work [Sleep] : sleep [Social interactions] : social interactions [Nothing helps with pain getting better] : Nothing helps with pain getting better [Exercising] : exercising [Full time] : Work status: full time [de-identified] : 09/08/2023 :ROSALEE ROMERO , a 43 year old male, presents today for intermittent pain in bilateral shoulders (L>R) x 4 months. Pain worsened with activities, reaching [place]. States he has noticed a "bump" over the region of the left AC joint with associated "popping"  [] : no

## 2023-10-04 ENCOUNTER — APPOINTMENT (OUTPATIENT)
Dept: INTERNAL MEDICINE | Facility: CLINIC | Age: 43
End: 2023-10-04
Payer: COMMERCIAL

## 2023-10-04 VITALS
OXYGEN SATURATION: 98 % | DIASTOLIC BLOOD PRESSURE: 78 MMHG | RESPIRATION RATE: 14 BRPM | HEART RATE: 65 BPM | BODY MASS INDEX: 24.38 KG/M2 | TEMPERATURE: 98.6 F | WEIGHT: 180 LBS | HEIGHT: 72 IN | SYSTOLIC BLOOD PRESSURE: 120 MMHG

## 2023-10-04 DIAGNOSIS — I10 ESSENTIAL (PRIMARY) HYPERTENSION: ICD-10-CM

## 2023-10-04 DIAGNOSIS — E53.8 DEFICIENCY OF OTHER SPECIFIED B GROUP VITAMINS: ICD-10-CM

## 2023-10-04 PROCEDURE — 99214 OFFICE O/P EST MOD 30 MIN: CPT

## 2023-10-05 ENCOUNTER — TRANSCRIPTION ENCOUNTER (OUTPATIENT)
Age: 43
End: 2023-10-05

## 2023-10-05 LAB
ALBUMIN SERPL ELPH-MCNC: 4.9 G/DL
ALP BLD-CCNC: 60 U/L
ALT SERPL-CCNC: 16 U/L
ANION GAP SERPL CALC-SCNC: 14 MMOL/L
AST SERPL-CCNC: 27 U/L
BILIRUB SERPL-MCNC: 0.8 MG/DL
BUN SERPL-MCNC: 6 MG/DL
CALCIUM SERPL-MCNC: 10.1 MG/DL
CHLORIDE SERPL-SCNC: 102 MMOL/L
CHOLEST SERPL-MCNC: 182 MG/DL
CO2 SERPL-SCNC: 26 MMOL/L
CREAT SERPL-MCNC: 0.98 MG/DL
CREAT SPEC-SCNC: 56 MG/DL
EGFR: 98 ML/MIN/1.73M2
FOLATE SERPL-MCNC: >20 NG/ML
GLUCOSE SERPL-MCNC: 100 MG/DL
HCT VFR BLD CALC: 45.7 %
HDLC SERPL-MCNC: 94 MG/DL
HGB BLD-MCNC: 15.4 G/DL
LDLC SERPL CALC-MCNC: 72 MG/DL
MCHC RBC-ENTMCNC: 33.1 PG
MCHC RBC-ENTMCNC: 33.7 GM/DL
MCV RBC AUTO: 98.3 FL
MICROALBUMIN 24H UR DL<=1MG/L-MCNC: <1.2 MG/DL
MICROALBUMIN/CREAT 24H UR-RTO: NORMAL MG/G
NONHDLC SERPL-MCNC: 88 MG/DL
PLATELET # BLD AUTO: 229 K/UL
POTASSIUM SERPL-SCNC: 4.2 MMOL/L
PROT SERPL-MCNC: 7.3 G/DL
RBC # BLD: 4.65 M/UL
RBC # FLD: 12.5 %
SODIUM SERPL-SCNC: 141 MMOL/L
TRIGL SERPL-MCNC: 91 MG/DL
VIT B12 SERPL-MCNC: 524 PG/ML
WBC # FLD AUTO: 6.69 K/UL

## 2024-02-26 ENCOUNTER — OUTPATIENT (OUTPATIENT)
Dept: OUTPATIENT SERVICES | Facility: HOSPITAL | Age: 44
LOS: 1 days | End: 2024-02-26
Payer: COMMERCIAL

## 2024-02-26 ENCOUNTER — APPOINTMENT (OUTPATIENT)
Dept: ULTRASOUND IMAGING | Facility: CLINIC | Age: 44
End: 2024-02-26
Payer: COMMERCIAL

## 2024-02-26 DIAGNOSIS — Z00.8 ENCOUNTER FOR OTHER GENERAL EXAMINATION: ICD-10-CM

## 2024-02-26 PROCEDURE — 76536 US EXAM OF HEAD AND NECK: CPT

## 2024-02-26 PROCEDURE — 76536 US EXAM OF HEAD AND NECK: CPT | Mod: 26

## 2024-03-13 ENCOUNTER — NON-APPOINTMENT (OUTPATIENT)
Age: 44
End: 2024-03-13

## 2024-03-13 ENCOUNTER — APPOINTMENT (OUTPATIENT)
Dept: INTERNAL MEDICINE | Facility: CLINIC | Age: 44
End: 2024-03-13
Payer: COMMERCIAL

## 2024-03-13 VITALS
OXYGEN SATURATION: 99 % | SYSTOLIC BLOOD PRESSURE: 120 MMHG | TEMPERATURE: 97.9 F | HEART RATE: 55 BPM | DIASTOLIC BLOOD PRESSURE: 80 MMHG | BODY MASS INDEX: 24.79 KG/M2 | HEIGHT: 72 IN | RESPIRATION RATE: 14 BRPM | WEIGHT: 183 LBS

## 2024-03-13 DIAGNOSIS — L02.419 CUTANEOUS ABSCESS OF LIMB, UNSPECIFIED: ICD-10-CM

## 2024-03-13 DIAGNOSIS — Z00.00 ENCOUNTER FOR GENERAL ADULT MEDICAL EXAMINATION W/OUT ABNORMAL FINDINGS: ICD-10-CM

## 2024-03-13 DIAGNOSIS — Z87.898 PERSONAL HISTORY OF OTHER SPECIFIED CONDITIONS: ICD-10-CM

## 2024-03-13 PROCEDURE — 93000 ELECTROCARDIOGRAM COMPLETE: CPT

## 2024-03-13 PROCEDURE — 99396 PREV VISIT EST AGE 40-64: CPT | Mod: 25

## 2024-03-13 NOTE — REVIEW OF SYSTEMS
[Fever] : no fever [Night Sweats] : no night sweats [Chills] : no chills [Discharge] : no discharge [Vision Problems] : no vision problems [Itching] : no itching [Earache] : no earache [Nasal Discharge] : no nasal discharge [Sore Throat] : no sore throat [Chest Pain] : no chest pain [Palpitations] : no palpitations [Lower Ext Edema] : no lower extremity edema [Shortness Of Breath] : no shortness of breath [Wheezing] : no wheezing [Cough] : no cough [Abdominal Pain] : no abdominal pain [Dyspnea on Exertion] : not dyspnea on exertion [Nausea] : no nausea [Constipation] : no constipation [Diarrhea] : no diarrhea [Vomiting] : no vomiting [Melena] : no melena [Dysuria] : no dysuria [Muscle Weakness] : no muscle weakness [Muscle Pain] : no muscle pain [Skin Rash] : no skin rash [Headache] : no headache [Dizziness] : no dizziness [Suicidal] : not suicidal [Anxiety] : no anxiety [Depression] : no depression [Easy Bleeding] : no easy bleeding [Easy Bruising] : no easy bruising [Swollen Glands] : no swollen glands

## 2024-03-13 NOTE — ASSESSMENT
[FreeTextEntry1] : HTN: BP controlled. On amlodipine, metoprolol.  HCM: Check labs. Urged him to cut back on alcohol. Discussed lung ca screening at 50. EKG shows sinus bradycardia. 
no

## 2024-03-13 NOTE — HEALTH RISK ASSESSMENT
[Good] : ~his/her~ current health as good [Yes] : Yes [0] : 2) Feeling down, depressed, or hopeless: Not at all (0) [PHQ-2 Negative - No further assessment needed] : PHQ-2 Negative - No further assessment needed [Former] : Former [20 or more] : 20 or more [> 15 Years] : > 15 Years [[id number]] : 0 [Change in mental status noted] : No change in mental status noted

## 2024-03-13 NOTE — PHYSICAL EXAM
[No Acute Distress] : no acute distress [Normal Sclera/Conjunctiva] : normal sclera/conjunctiva [PERRL] : pupils equal round and reactive to light [EOMI] : extraocular movements intact [Normal Oropharynx] : the oropharynx was normal [Normal TMs] : both tympanic membranes were normal [No Lymphadenopathy] : no lymphadenopathy [Supple] : supple [Thyroid Normal, No Nodules] : the thyroid was normal and there were no nodules present [No Respiratory Distress] : no respiratory distress  [No Accessory Muscle Use] : no accessory muscle use [Clear to Auscultation] : lungs were clear to auscultation bilaterally [Normal Rate] : normal rate  [Regular Rhythm] : with a regular rhythm [Normal S1, S2] : normal S1 and S2 [No Edema] : there was no peripheral edema [No Murmur] : no murmur heard [Soft] : abdomen soft [Non Tender] : non-tender [Non-distended] : non-distended [Normal Bowel Sounds] : normal bowel sounds [Normal Posterior Cervical Nodes] : no posterior cervical lymphadenopathy [Normal Anterior Cervical Nodes] : no anterior cervical lymphadenopathy [No Spinal Tenderness] : no spinal tenderness [Grossly Normal Strength/Tone] : grossly normal strength/tone [No Focal Deficits] : no focal deficits [Normal Gait] : normal gait [Deep Tendon Reflexes (DTR)] : deep tendon reflexes were 2+ and symmetric [Normal Affect] : the affect was normal [Normal Insight/Judgement] : insight and judgment were intact

## 2024-03-14 LAB
ALBUMIN SERPL ELPH-MCNC: 4.8 G/DL
ALP BLD-CCNC: 58 U/L
ALT SERPL-CCNC: 12 U/L
ANION GAP SERPL CALC-SCNC: 11 MMOL/L
AST SERPL-CCNC: 24 U/L
BASOPHILS # BLD AUTO: 0.05 K/UL
BASOPHILS NFR BLD AUTO: 0.8 %
BILIRUB SERPL-MCNC: 1 MG/DL
BUN SERPL-MCNC: 10 MG/DL
CALCIUM SERPL-MCNC: 9.8 MG/DL
CHLORIDE SERPL-SCNC: 102 MMOL/L
CHOLEST SERPL-MCNC: 178 MG/DL
CO2 SERPL-SCNC: 27 MMOL/L
CREAT SERPL-MCNC: 1.01 MG/DL
EGFR: 94 ML/MIN/1.73M2
EOSINOPHIL # BLD AUTO: 0.24 K/UL
EOSINOPHIL NFR BLD AUTO: 3.7 %
ESTIMATED AVERAGE GLUCOSE: 103 MG/DL
FOLATE SERPL-MCNC: 11.2 NG/ML
GLUCOSE SERPL-MCNC: 96 MG/DL
HBA1C MFR BLD HPLC: 5.2 %
HCT VFR BLD CALC: 44.8 %
HDLC SERPL-MCNC: 81 MG/DL
HGB BLD-MCNC: 15.1 G/DL
IMM GRANULOCYTES NFR BLD AUTO: 0.3 %
LDLC SERPL CALC-MCNC: 79 MG/DL
LYMPHOCYTES # BLD AUTO: 1.35 K/UL
LYMPHOCYTES NFR BLD AUTO: 20.9 %
MAN DIFF?: NORMAL
MCHC RBC-ENTMCNC: 31.6 PG
MCHC RBC-ENTMCNC: 33.7 GM/DL
MCV RBC AUTO: 93.7 FL
MONOCYTES # BLD AUTO: 0.61 K/UL
MONOCYTES NFR BLD AUTO: 9.5 %
NEUTROPHILS # BLD AUTO: 4.18 K/UL
NEUTROPHILS NFR BLD AUTO: 64.8 %
NONHDLC SERPL-MCNC: 96 MG/DL
PLATELET # BLD AUTO: 253 K/UL
POTASSIUM SERPL-SCNC: 4.3 MMOL/L
PROT SERPL-MCNC: 7.1 G/DL
RBC # BLD: 4.78 M/UL
RBC # FLD: 13.1 %
SODIUM SERPL-SCNC: 141 MMOL/L
TRIGL SERPL-MCNC: 96 MG/DL
TSH SERPL-ACNC: 1.21 UIU/ML
VIT B12 SERPL-MCNC: 475 PG/ML
WBC # FLD AUTO: 6.45 K/UL

## 2024-04-29 ENCOUNTER — NON-APPOINTMENT (OUTPATIENT)
Age: 44
End: 2024-04-29

## 2024-10-07 ENCOUNTER — APPOINTMENT (OUTPATIENT)
Dept: INTERNAL MEDICINE | Facility: CLINIC | Age: 44
End: 2024-10-07

## 2024-10-23 ENCOUNTER — APPOINTMENT (OUTPATIENT)
Dept: INTERNAL MEDICINE | Facility: CLINIC | Age: 44
End: 2024-10-23
Payer: COMMERCIAL

## 2024-10-23 VITALS
RESPIRATION RATE: 14 BRPM | HEART RATE: 68 BPM | TEMPERATURE: 98 F | SYSTOLIC BLOOD PRESSURE: 120 MMHG | OXYGEN SATURATION: 99 % | DIASTOLIC BLOOD PRESSURE: 82 MMHG | BODY MASS INDEX: 24.79 KG/M2 | WEIGHT: 183 LBS | HEIGHT: 72 IN

## 2024-10-23 DIAGNOSIS — I10 ESSENTIAL (PRIMARY) HYPERTENSION: ICD-10-CM

## 2024-10-23 DIAGNOSIS — E53.8 DEFICIENCY OF OTHER SPECIFIED B GROUP VITAMINS: ICD-10-CM

## 2024-10-23 PROCEDURE — 99214 OFFICE O/P EST MOD 30 MIN: CPT

## 2024-10-23 PROCEDURE — G2211 COMPLEX E/M VISIT ADD ON: CPT | Mod: NC

## 2024-10-23 NOTE — HISTORY OF PRESENT ILLNESS
[de-identified] : 44M presents for follow-up of HTN, folic acid deficiency. For HTN, BP controlled on amlodipine, metoprolol. For folate deficiency, he is on folic acid. Due for bloodwork.

## 2024-10-24 LAB
ALBUMIN SERPL ELPH-MCNC: 4.6 G/DL
ALP BLD-CCNC: 54 U/L
ALT SERPL-CCNC: 13 U/L
ANION GAP SERPL CALC-SCNC: 12 MMOL/L
AST SERPL-CCNC: 28 U/L
BASOPHILS # BLD AUTO: 0.04 K/UL
BASOPHILS NFR BLD AUTO: 0.6 %
BILIRUB SERPL-MCNC: 0.7 MG/DL
BUN SERPL-MCNC: 17 MG/DL
CALCIUM SERPL-MCNC: 10.4 MG/DL
CHLORIDE SERPL-SCNC: 103 MMOL/L
CO2 SERPL-SCNC: 29 MMOL/L
CREAT SERPL-MCNC: 1.21 MG/DL
CREAT SPEC-SCNC: 93 MG/DL
CREAT/PROT UR: 0.1 RATIO
EGFR: 76 ML/MIN/1.73M2
EOSINOPHIL # BLD AUTO: 0.17 K/UL
EOSINOPHIL NFR BLD AUTO: 2.4 %
FOLATE SERPL-MCNC: 7.7 NG/ML
GLUCOSE SERPL-MCNC: 100 MG/DL
HCT VFR BLD CALC: 44.8 %
HGB BLD-MCNC: 14.8 G/DL
IMM GRANULOCYTES NFR BLD AUTO: 0.3 %
LYMPHOCYTES # BLD AUTO: 1.65 K/UL
LYMPHOCYTES NFR BLD AUTO: 23.1 %
MAN DIFF?: NORMAL
MCHC RBC-ENTMCNC: 33 GM/DL
MCHC RBC-ENTMCNC: 33 PG
MCV RBC AUTO: 99.8 FL
MONOCYTES # BLD AUTO: 0.63 K/UL
MONOCYTES NFR BLD AUTO: 8.8 %
NEUTROPHILS # BLD AUTO: 4.63 K/UL
NEUTROPHILS NFR BLD AUTO: 64.8 %
PLATELET # BLD AUTO: 255 K/UL
POTASSIUM SERPL-SCNC: 4.5 MMOL/L
PROT SERPL-MCNC: 6.8 G/DL
PROT UR-MCNC: 6 MG/DL
RBC # BLD: 4.49 M/UL
RBC # FLD: 13 %
SODIUM SERPL-SCNC: 144 MMOL/L
VIT B12 SERPL-MCNC: 440 PG/ML
WBC # FLD AUTO: 7.14 K/UL

## 2025-02-14 NOTE — ASSESSMENT
Problem: Chronic Conditions and Co-morbidities  Goal: Patient's chronic conditions and co-morbidity symptoms are monitored and maintained or improved  Outcome: Progressing     Problem: Discharge Planning  Goal: Discharge to home or other facility with appropriate resources  Outcome: Progressing     Problem: Pain  Goal: Verbalizes/displays adequate comfort level or baseline comfort level  Outcome: Progressing     Problem: Safety - Adult  Goal: Free from fall injury  Outcome: Progressing      [FreeTextEntry1] : HTN: BP controlled. Continue amlodipine 5mg daily, metoprolol ER 25mg daily. Check CMP, prot/cr.  Folic acid deficiency: Check folate, CBC. Continue folic acid 1mg daily.

## 2025-06-09 ENCOUNTER — NON-APPOINTMENT (OUTPATIENT)
Age: 45
End: 2025-06-09

## 2025-06-11 ENCOUNTER — APPOINTMENT (OUTPATIENT)
Dept: INTERNAL MEDICINE | Facility: CLINIC | Age: 45
End: 2025-06-11
Payer: COMMERCIAL

## 2025-06-11 VITALS
OXYGEN SATURATION: 99 % | HEIGHT: 72 IN | WEIGHT: 185 LBS | BODY MASS INDEX: 25.06 KG/M2 | DIASTOLIC BLOOD PRESSURE: 72 MMHG | HEART RATE: 68 BPM | RESPIRATION RATE: 14 BRPM | SYSTOLIC BLOOD PRESSURE: 110 MMHG | TEMPERATURE: 98.3 F

## 2025-06-11 PROCEDURE — 99396 PREV VISIT EST AGE 40-64: CPT

## 2025-06-11 RX ORDER — CLOBETASOL PROPIONATE 0.5 MG/G
0.05 OINTMENT TOPICAL TWICE DAILY
Qty: 1 | Refills: 0 | Status: ACTIVE | COMMUNITY
Start: 2025-06-11 | End: 1900-01-01

## 2025-06-11 NOTE — HEALTH RISK ASSESSMENT
[Good] : ~his/her~  mood as  good [Yes] : Yes [Never (0 pts)] : Never (0 points) [4 or more  times a week (4 pts)] : 4 or more  times a week (4 points) [3 or 4 (1 pt)] : 3 or 4  (1 point) [No] : In the past 12 months have you used drugs other than those required for medical reasons? No [0] : 2) Feeling down, depressed, or hopeless: Not at all (0) [PHQ-2 Negative - No further assessment needed] : PHQ-2 Negative - No further assessment needed [Former] : Former [20 or more] : 20 or more [< 15 Years] : < 15 Years [Audit-CScore] : 5 [[id number]] : 0 [Change in mental status noted] : No change in mental status noted

## 2025-06-11 NOTE — ASSESSMENT
[Vaccines Reviewed] : Immunizations reviewed today. Please see immunization details in the vaccine log within the immunization flowsheet.  [FreeTextEntry1] : HTN: BP controlled. On amlodipine, metoprolol. EKG shows sinus rhythm.  HCM: Requested colonoscopy for review. Discussed Lung ca screening at 50. Check labs. Counseled on reducing alcohol intake.

## 2025-06-11 NOTE — PHYSICAL EXAM
[No Acute Distress] : no acute distress [Normal Sclera/Conjunctiva] : normal sclera/conjunctiva [EOMI] : extraocular movements intact [PERRL] : pupils equal round and reactive to light [Normal Oropharynx] : the oropharynx was normal [No Lymphadenopathy] : no lymphadenopathy [Normal TMs] : both tympanic membranes were normal [Supple] : supple [Thyroid Normal, No Nodules] : the thyroid was normal and there were no nodules present [No Respiratory Distress] : no respiratory distress  [No Accessory Muscle Use] : no accessory muscle use [Clear to Auscultation] : lungs were clear to auscultation bilaterally [Normal Rate] : normal rate  [Normal S1, S2] : normal S1 and S2 [Regular Rhythm] : with a regular rhythm [No Murmur] : no murmur heard [No Edema] : there was no peripheral edema [Soft] : abdomen soft [Non Tender] : non-tender [Non-distended] : non-distended [Normal Bowel Sounds] : normal bowel sounds [Normal Posterior Cervical Nodes] : no posterior cervical lymphadenopathy [Normal Anterior Cervical Nodes] : no anterior cervical lymphadenopathy [No Spinal Tenderness] : no spinal tenderness [Grossly Normal Strength/Tone] : grossly normal strength/tone [No Focal Deficits] : no focal deficits [Normal Gait] : normal gait [Deep Tendon Reflexes (DTR)] : deep tendon reflexes were 2+ and symmetric [Normal Affect] : the affect was normal [Normal Insight/Judgement] : insight and judgment were intact

## 2025-06-12 LAB
ALBUMIN SERPL ELPH-MCNC: 4.7 G/DL
ALP BLD-CCNC: 60 U/L
ALT SERPL-CCNC: 23 U/L
ANION GAP SERPL CALC-SCNC: 14 MMOL/L
AST SERPL-CCNC: 31 U/L
BILIRUB SERPL-MCNC: 0.8 MG/DL
BUN SERPL-MCNC: 14 MG/DL
CALCIUM SERPL-MCNC: 9.5 MG/DL
CHLORIDE SERPL-SCNC: 102 MMOL/L
CHOLEST SERPL-MCNC: 172 MG/DL
CO2 SERPL-SCNC: 26 MMOL/L
CREAT SERPL-MCNC: 1.28 MG/DL
CREAT SPEC-SCNC: 242 MG/DL
CREAT/PROT UR: 0.1 RATIO
EGFRCR SERPLBLD CKD-EPI 2021: 70 ML/MIN/1.73M2
ESTIMATED AVERAGE GLUCOSE: 105 MG/DL
FOLATE SERPL-MCNC: 7.1 NG/ML
GLUCOSE SERPL-MCNC: 90 MG/DL
HBA1C MFR BLD HPLC: 5.3 %
HCT VFR BLD CALC: 47.1 %
HDLC SERPL-MCNC: 94 MG/DL
HGB BLD-MCNC: 15 G/DL
LDLC SERPL-MCNC: 59 MG/DL
MCHC RBC-ENTMCNC: 31.8 G/DL
MCHC RBC-ENTMCNC: 31.8 PG
MCV RBC AUTO: 99.8 FL
NONHDLC SERPL-MCNC: 77 MG/DL
PLATELET # BLD AUTO: 314 K/UL
POTASSIUM SERPL-SCNC: 4.4 MMOL/L
PROT SERPL-MCNC: 7 G/DL
PROT UR-MCNC: 11 MG/DL
RBC # BLD: 4.72 M/UL
RBC # FLD: 13.2 %
SODIUM SERPL-SCNC: 141 MMOL/L
TRIGL SERPL-MCNC: 104 MG/DL
TSH SERPL-ACNC: 0.78 UIU/ML
VIT B12 SERPL-MCNC: 498 PG/ML
WBC # FLD AUTO: 6.31 K/UL

## 2025-07-03 NOTE — ED ADULT TRIAGE NOTE - ARRIVAL FROM
Medication Requested:     Disp Refills Start End     cariprazine (VRAYLAR) 1.5 MG capsule 30 capsule 2 6/10/2025 --    Sig - Route: Take 1 capsule by mouth daily.       Next visit: Visit date not found    [id number] - [place] [id number] AT [facility] [address]      DENIED: Refills on file       Home